# Patient Record
Sex: FEMALE | Race: BLACK OR AFRICAN AMERICAN | NOT HISPANIC OR LATINO | Employment: OTHER | URBAN - METROPOLITAN AREA
[De-identification: names, ages, dates, MRNs, and addresses within clinical notes are randomized per-mention and may not be internally consistent; named-entity substitution may affect disease eponyms.]

---

## 2023-01-25 PROBLEM — M17.9 OSTEOARTHRITIS OF KNEE: Status: ACTIVE | Noted: 2023-01-25

## 2023-01-25 PROBLEM — G47.10 HYPERSOMNOLENCE: Status: ACTIVE | Noted: 2023-01-25

## 2023-01-25 PROBLEM — M75.22 BICEPS TENDINITIS OF LEFT UPPER EXTREMITY: Status: ACTIVE | Noted: 2023-01-25

## 2023-01-25 PROBLEM — K76.89 LIVER CYST: Status: ACTIVE | Noted: 2023-01-25

## 2023-01-25 PROBLEM — M53.3 COCCYX PAIN: Status: ACTIVE | Noted: 2023-01-25

## 2023-01-25 PROBLEM — I67.2 CEREBRAL ATHEROSCLEROSIS: Status: ACTIVE | Noted: 2023-01-25

## 2023-01-25 PROBLEM — E78.00 HYPERCHOLESTEROLEMIA: Status: ACTIVE | Noted: 2023-01-25

## 2023-01-25 PROBLEM — G56.02 MILD CARPAL TUNNEL SYNDROME, LEFT: Status: ACTIVE | Noted: 2023-01-25

## 2023-01-25 PROBLEM — I10 HYPERTENSION: Status: ACTIVE | Noted: 2023-01-25

## 2023-01-25 PROBLEM — M53.3 SACRAL PAIN: Status: ACTIVE | Noted: 2023-01-25

## 2023-01-25 PROBLEM — D64.9 ANEMIA: Status: ACTIVE | Noted: 2023-01-25

## 2023-01-25 PROBLEM — I10 ESSENTIAL HYPERTENSION: Status: ACTIVE | Noted: 2023-01-25

## 2023-01-25 PROBLEM — R29.810 FACIAL DROOP: Status: ACTIVE | Noted: 2023-01-25

## 2023-01-25 PROBLEM — M11.20 PSEUDOGOUT: Status: ACTIVE | Noted: 2023-01-25

## 2023-01-25 PROBLEM — G56.22 CUBITAL TUNNEL SYNDROME, LEFT: Status: ACTIVE | Noted: 2023-01-25

## 2023-01-25 PROBLEM — D47.2 IGG MONOCLONAL GAMMOPATHY: Status: ACTIVE | Noted: 2023-01-25

## 2023-01-25 PROBLEM — R93.2 ABNORMAL MRI, LIVER: Status: ACTIVE | Noted: 2023-01-25

## 2023-01-25 PROBLEM — M85.80 OSTEOPENIA: Status: ACTIVE | Noted: 2023-01-25

## 2023-01-25 PROBLEM — I69.998 SENSATION ALTERATION, LATE EFFECT OF CEREBROVASCULAR DISEASE: Status: ACTIVE | Noted: 2023-01-25

## 2023-01-25 PROBLEM — E55.9 VITAMIN D DEFICIENCY: Status: ACTIVE | Noted: 2023-01-25

## 2023-01-25 PROBLEM — R20.9 SENSATION ALTERATION, LATE EFFECT OF CEREBROVASCULAR DISEASE: Status: ACTIVE | Noted: 2023-01-25

## 2023-01-25 PROBLEM — K76.0 NAFLD (NONALCOHOLIC FATTY LIVER DISEASE): Status: ACTIVE | Noted: 2023-01-25

## 2023-01-25 PROBLEM — M54.31 SCIATICA OF RIGHT SIDE: Status: ACTIVE | Noted: 2023-01-25

## 2023-01-25 PROBLEM — N18.4 CKD (CHRONIC KIDNEY DISEASE) STAGE 4, GFR 15-29 ML/MIN (MULTI): Status: ACTIVE | Noted: 2023-01-25

## 2023-01-25 PROBLEM — T78.3XXA: Status: ACTIVE | Noted: 2023-01-25

## 2023-01-25 PROBLEM — M65.30 TRIGGER FINGER: Status: ACTIVE | Noted: 2023-01-25

## 2023-01-25 PROBLEM — M79.671 RIGHT FOOT PAIN: Status: ACTIVE | Noted: 2023-01-25

## 2023-01-25 PROBLEM — R13.10 DYSPHAGIA: Status: ACTIVE | Noted: 2023-01-25

## 2023-01-25 PROBLEM — E78.5 HYPERLIPIDEMIA: Status: ACTIVE | Noted: 2023-01-25

## 2023-01-25 PROBLEM — E11.9 DIABETES MELLITUS (MULTI): Status: ACTIVE | Noted: 2023-01-25

## 2023-01-25 PROBLEM — L05.91 CYST NEAR COCCYX: Status: ACTIVE | Noted: 2023-01-25

## 2023-01-25 PROBLEM — M25.512 SHOULDER PAIN, LEFT: Status: ACTIVE | Noted: 2023-01-25

## 2023-01-25 PROBLEM — M20.42 HAMMER TOE OF SECOND TOE OF LEFT FOOT: Status: ACTIVE | Noted: 2023-01-25

## 2023-01-25 PROBLEM — G51.0 BELL'S PALSY: Status: ACTIVE | Noted: 2023-01-25

## 2023-01-25 RX ORDER — ALLOPURINOL 100 MG/1
1 TABLET ORAL DAILY
COMMUNITY
Start: 2017-02-15 | End: 2024-02-29 | Stop reason: ALTCHOICE

## 2023-01-25 RX ORDER — ISOPROPYL ALCOHOL 70 ML/100ML
SWAB TOPICAL
COMMUNITY
End: 2023-08-18 | Stop reason: SDUPTHER

## 2023-01-25 RX ORDER — AMLODIPINE BESYLATE 5 MG/1
1 TABLET ORAL DAILY
Status: ON HOLD | COMMUNITY
Start: 2021-02-11 | End: 2023-10-11

## 2023-01-25 RX ORDER — TRAMADOL HYDROCHLORIDE 50 MG/1
1 TABLET ORAL 3 TIMES DAILY PRN
Status: ON HOLD | COMMUNITY
End: 2023-10-11 | Stop reason: ENTERED-IN-ERROR

## 2023-01-25 RX ORDER — ATORVASTATIN CALCIUM 20 MG/1
1 TABLET, FILM COATED ORAL DAILY
Status: ON HOLD | COMMUNITY
End: 2023-10-11 | Stop reason: DRUGHIGH

## 2023-01-25 RX ORDER — LANOLIN ALCOHOL/MO/W.PET/CERES
1 CREAM (GRAM) TOPICAL DAILY
Status: ON HOLD | COMMUNITY
Start: 2020-11-05 | End: 2023-10-11 | Stop reason: ALTCHOICE

## 2023-01-25 RX ORDER — FUROSEMIDE 40 MG/1
2 TABLET ORAL DAILY
COMMUNITY

## 2023-01-25 RX ORDER — ISOPROPYL ALCOHOL 70 ML/100ML
SWAB TOPICAL
Status: ON HOLD | COMMUNITY
End: 2023-10-11

## 2023-01-25 RX ORDER — CLOPIDOGREL BISULFATE 75 MG/1
1 TABLET ORAL DAILY
COMMUNITY

## 2023-01-25 RX ORDER — TIZANIDINE 2 MG/1
2 TABLET ORAL NIGHTLY PRN
COMMUNITY

## 2023-01-25 RX ORDER — SENNOSIDES 25 MG/1
TABLET, FILM COATED ORAL
Status: ON HOLD | COMMUNITY
Start: 2021-12-17 | End: 2023-10-11 | Stop reason: ENTERED-IN-ERROR

## 2023-01-25 RX ORDER — PEN NEEDLE, DIABETIC 30 GX3/16"
NEEDLE, DISPOSABLE MISCELLANEOUS
Status: ON HOLD | COMMUNITY
End: 2023-10-11 | Stop reason: WASHOUT

## 2023-01-25 RX ORDER — CALCIUM CITRATE/VITAMIN D3 200MG-6.25
TABLET ORAL
Status: ON HOLD | COMMUNITY
End: 2023-10-11 | Stop reason: ENTERED-IN-ERROR

## 2023-01-25 RX ORDER — HYDROXYZINE HYDROCHLORIDE 10 MG/1
1 TABLET, FILM COATED ORAL 3 TIMES DAILY PRN
COMMUNITY

## 2023-01-25 RX ORDER — CLOBETASOL PROPIONATE 0.5 MG/G
OINTMENT TOPICAL
COMMUNITY
Start: 2016-08-04

## 2023-01-25 RX ORDER — HUMAN INSULIN 100 [IU]/ML
25 INJECTION, SUSPENSION SUBCUTANEOUS
COMMUNITY

## 2023-01-25 RX ORDER — ATENOLOL 50 MG/1
1 TABLET ORAL DAILY
Status: ON HOLD | COMMUNITY
End: 2023-10-11

## 2023-07-20 LAB
ALBUMIN (MG/L) IN URINE: 79.3 MG/L
ALBUMIN/CREATININE (UG/MG) IN URINE: 84.5 UG/MG CRT (ref 0–30)
APPEARANCE, URINE: CLEAR
BILIRUBIN, URINE: NEGATIVE
BLOOD, URINE: NEGATIVE
COLOR, URINE: ABNORMAL
CREATININE (MG/DL) IN URINE: 93.9 MG/DL (ref 20–320)
GLUCOSE, URINE: NEGATIVE MG/DL
KETONES, URINE: NEGATIVE MG/DL
LEUKOCYTE ESTERASE, URINE: ABNORMAL
NITRITE, URINE: NEGATIVE
PH, URINE: 6 (ref 5–8)
PROTEIN, URINE: NEGATIVE MG/DL
RBC, URINE: NORMAL /HPF (ref 0–5)
SPECIFIC GRAVITY, URINE: 1.01 (ref 1–1.03)
SQUAMOUS EPITHELIAL CELLS, URINE: <1 /HPF
TRANSITIONAL EPITHELIAL CELLS, URINE: <1 /HPF
UROBILINOGEN, URINE: <2 MG/DL (ref 0–1.9)
WBC, URINE: 5 /HPF (ref 0–5)

## 2023-08-18 PROBLEM — R80.9 PROTEINURIA: Status: ACTIVE | Noted: 2023-08-18

## 2023-08-18 RX ORDER — ACETAMINOPHEN 325 MG/1
2 TABLET ORAL EVERY 6 HOURS
Status: ON HOLD | COMMUNITY
Start: 2021-12-12 | End: 2023-10-11

## 2023-08-18 RX ORDER — HYDROXYZINE HYDROCHLORIDE 25 MG/1
1 TABLET, FILM COATED ORAL NIGHTLY
Status: ON HOLD | COMMUNITY
End: 2023-10-11 | Stop reason: DRUGHIGH

## 2023-08-18 RX ORDER — INSULIN ASPART 100 [IU]/ML
18-20 INJECTION, SOLUTION INTRAVENOUS; SUBCUTANEOUS 2 TIMES DAILY
Status: ON HOLD | COMMUNITY
End: 2023-10-11 | Stop reason: ENTERED-IN-ERROR

## 2023-08-18 RX ORDER — CLONIDINE HYDROCHLORIDE 0.1 MG/1
1 TABLET ORAL 2 TIMES DAILY
Status: ON HOLD | COMMUNITY
End: 2023-10-11 | Stop reason: ENTERED-IN-ERROR

## 2023-08-18 RX ORDER — IRBESARTAN 75 MG/1
75 TABLET ORAL DAILY
COMMUNITY
End: 2023-12-19

## 2023-08-18 RX ORDER — DICLOFENAC SODIUM 10 MG/G
GEL TOPICAL DAILY
Status: ON HOLD | COMMUNITY
End: 2023-10-11 | Stop reason: ENTERED-IN-ERROR

## 2023-10-10 ENCOUNTER — HOSPITAL ENCOUNTER (OUTPATIENT)
Facility: HOSPITAL | Age: 78
Setting detail: OBSERVATION
Discharge: HOME | End: 2023-10-12
Attending: EMERGENCY MEDICINE | Admitting: INTERNAL MEDICINE
Payer: MEDICARE

## 2023-10-10 ENCOUNTER — APPOINTMENT (OUTPATIENT)
Dept: RADIOLOGY | Facility: HOSPITAL | Age: 78
End: 2023-10-10
Payer: MEDICARE

## 2023-10-10 DIAGNOSIS — R79.89 ELEVATED D-DIMER: ICD-10-CM

## 2023-10-10 DIAGNOSIS — R60.1 GENERALIZED EDEMA: ICD-10-CM

## 2023-10-10 DIAGNOSIS — R07.9 CHEST PAIN, UNSPECIFIED TYPE: Primary | ICD-10-CM

## 2023-10-10 DIAGNOSIS — N18.9 CHRONIC RENAL IMPAIRMENT, UNSPECIFIED CKD STAGE: ICD-10-CM

## 2023-10-10 DIAGNOSIS — K81.9 CHOLECYSTITIS: ICD-10-CM

## 2023-10-10 LAB
ALBUMIN SERPL BCP-MCNC: 4.4 G/DL (ref 3.4–5)
ALP SERPL-CCNC: 136 U/L (ref 33–136)
ALT SERPL W P-5'-P-CCNC: 11 U/L (ref 7–45)
ANION GAP SERPL CALC-SCNC: 16 MMOL/L (ref 10–20)
AST SERPL W P-5'-P-CCNC: 15 U/L (ref 9–39)
BASOPHILS # BLD AUTO: 0.07 X10*3/UL (ref 0–0.1)
BASOPHILS NFR BLD AUTO: 0.8 %
BILIRUB SERPL-MCNC: 0.5 MG/DL (ref 0–1.2)
BNP SERPL-MCNC: 56 PG/ML (ref 0–99)
BUN SERPL-MCNC: 27 MG/DL (ref 6–23)
CALCIUM SERPL-MCNC: 9.3 MG/DL (ref 8.6–10.3)
CARDIAC TROPONIN I PNL SERPL HS: 6 NG/L (ref 0–13)
CARDIAC TROPONIN I PNL SERPL HS: 7 NG/L (ref 0–13)
CARDIAC TROPONIN I PNL SERPL HS: 7 NG/L (ref 0–13)
CHLORIDE SERPL-SCNC: 102 MMOL/L (ref 98–107)
CO2 SERPL-SCNC: 24 MMOL/L (ref 21–32)
CREAT SERPL-MCNC: 2.37 MG/DL (ref 0.5–1.05)
D DIMER PPP FEU-MCNC: ABNORMAL NG/ML FEU
EOSINOPHIL # BLD AUTO: 0.3 X10*3/UL (ref 0–0.4)
EOSINOPHIL NFR BLD AUTO: 3.3 %
ERYTHROCYTE [DISTWIDTH] IN BLOOD BY AUTOMATED COUNT: 14.1 % (ref 11.5–14.5)
GFR SERPL CREATININE-BSD FRML MDRD: 21 ML/MIN/1.73M*2
GLUCOSE BLD MANUAL STRIP-MCNC: 137 MG/DL (ref 74–99)
GLUCOSE SERPL-MCNC: 166 MG/DL (ref 74–99)
HCT VFR BLD AUTO: 35.1 % (ref 36–46)
HGB BLD-MCNC: 11.7 G/DL (ref 12–16)
IMM GRANULOCYTES # BLD AUTO: 0.05 X10*3/UL (ref 0–0.5)
IMM GRANULOCYTES NFR BLD AUTO: 0.5 % (ref 0–0.9)
LYMPHOCYTES # BLD AUTO: 2.73 X10*3/UL (ref 0.8–3)
LYMPHOCYTES NFR BLD AUTO: 29.6 %
MAGNESIUM SERPL-MCNC: 1.5 MG/DL (ref 1.6–2.4)
MCH RBC QN AUTO: 28.3 PG (ref 26–34)
MCHC RBC AUTO-ENTMCNC: 33.3 G/DL (ref 32–36)
MCV RBC AUTO: 85 FL (ref 80–100)
MONOCYTES # BLD AUTO: 0.56 X10*3/UL (ref 0.05–0.8)
MONOCYTES NFR BLD AUTO: 6.1 %
NEUTROPHILS # BLD AUTO: 5.52 X10*3/UL (ref 1.6–5.5)
NEUTROPHILS NFR BLD AUTO: 59.7 %
NRBC BLD-RTO: 0 /100 WBCS (ref 0–0)
PLATELET # BLD AUTO: 261 X10*3/UL (ref 150–450)
PMV BLD AUTO: 11 FL (ref 7.5–11.5)
POTASSIUM SERPL-SCNC: 3.4 MMOL/L (ref 3.5–5.3)
PROT SERPL-MCNC: 8.2 G/DL (ref 6.4–8.2)
RBC # BLD AUTO: 4.13 X10*6/UL (ref 4–5.2)
SODIUM SERPL-SCNC: 139 MMOL/L (ref 136–145)
WBC # BLD AUTO: 9.2 X10*3/UL (ref 4.4–11.3)

## 2023-10-10 PROCEDURE — 71045 X-RAY EXAM CHEST 1 VIEW: CPT | Mod: FY

## 2023-10-10 PROCEDURE — 96372 THER/PROPH/DIAG INJ SC/IM: CPT | Performed by: NURSE PRACTITIONER

## 2023-10-10 PROCEDURE — 2500000001 HC RX 250 WO HCPCS SELF ADMINISTERED DRUGS (ALT 637 FOR MEDICARE OP): Performed by: NURSE PRACTITIONER

## 2023-10-10 PROCEDURE — 84484 ASSAY OF TROPONIN QUANT: CPT | Performed by: EMERGENCY MEDICINE

## 2023-10-10 PROCEDURE — 83735 ASSAY OF MAGNESIUM: CPT | Performed by: EMERGENCY MEDICINE

## 2023-10-10 PROCEDURE — 36415 COLL VENOUS BLD VENIPUNCTURE: CPT | Performed by: EMERGENCY MEDICINE

## 2023-10-10 PROCEDURE — 99285 EMERGENCY DEPT VISIT HI MDM: CPT | Performed by: EMERGENCY MEDICINE

## 2023-10-10 PROCEDURE — 85379 FIBRIN DEGRADATION QUANT: CPT | Performed by: NURSE PRACTITIONER

## 2023-10-10 PROCEDURE — 85025 COMPLETE CBC W/AUTO DIFF WBC: CPT | Performed by: EMERGENCY MEDICINE

## 2023-10-10 PROCEDURE — 96375 TX/PRO/DX INJ NEW DRUG ADDON: CPT

## 2023-10-10 PROCEDURE — G0378 HOSPITAL OBSERVATION PER HR: HCPCS

## 2023-10-10 PROCEDURE — 74176 CT ABD & PELVIS W/O CONTRAST: CPT | Performed by: RADIOLOGY

## 2023-10-10 PROCEDURE — 71250 CT THORAX DX C-: CPT | Performed by: RADIOLOGY

## 2023-10-10 PROCEDURE — 74176 CT ABD & PELVIS W/O CONTRAST: CPT

## 2023-10-10 PROCEDURE — 2500000004 HC RX 250 GENERAL PHARMACY W/ HCPCS (ALT 636 FOR OP/ED): Performed by: NURSE PRACTITIONER

## 2023-10-10 PROCEDURE — 84484 ASSAY OF TROPONIN QUANT: CPT | Mod: 59 | Performed by: NURSE PRACTITIONER

## 2023-10-10 PROCEDURE — 83880 ASSAY OF NATRIURETIC PEPTIDE: CPT | Performed by: EMERGENCY MEDICINE

## 2023-10-10 PROCEDURE — 71045 X-RAY EXAM CHEST 1 VIEW: CPT | Mod: FOREIGN READ | Performed by: RADIOLOGY

## 2023-10-10 PROCEDURE — 82947 ASSAY GLUCOSE BLOOD QUANT: CPT | Mod: 59

## 2023-10-10 PROCEDURE — 80053 COMPREHEN METABOLIC PANEL: CPT | Performed by: EMERGENCY MEDICINE

## 2023-10-10 PROCEDURE — 99222 1ST HOSP IP/OBS MODERATE 55: CPT | Performed by: NURSE PRACTITIONER

## 2023-10-10 RX ORDER — DOCUSATE SODIUM 100 MG/1
100 CAPSULE, LIQUID FILLED ORAL 2 TIMES DAILY
Status: DISCONTINUED | OUTPATIENT
Start: 2023-10-10 | End: 2023-10-12 | Stop reason: HOSPADM

## 2023-10-10 RX ORDER — INSULIN LISPRO 100 [IU]/ML
0-5 INJECTION, SOLUTION INTRAVENOUS; SUBCUTANEOUS
Status: DISCONTINUED | OUTPATIENT
Start: 2023-10-11 | End: 2023-10-11

## 2023-10-10 RX ORDER — ONDANSETRON HYDROCHLORIDE 2 MG/ML
4 INJECTION, SOLUTION INTRAVENOUS EVERY 8 HOURS PRN
Status: DISCONTINUED | OUTPATIENT
Start: 2023-10-10 | End: 2023-10-11

## 2023-10-10 RX ORDER — AMLODIPINE BESYLATE 5 MG/1
5 TABLET ORAL DAILY
Status: DISCONTINUED | OUTPATIENT
Start: 2023-10-10 | End: 2023-10-11

## 2023-10-10 RX ORDER — ATORVASTATIN CALCIUM 20 MG/1
20 TABLET, FILM COATED ORAL DAILY
Status: DISCONTINUED | OUTPATIENT
Start: 2023-10-10 | End: 2023-10-11

## 2023-10-10 RX ORDER — FUROSEMIDE 10 MG/ML
40 INJECTION INTRAMUSCULAR; INTRAVENOUS 2 TIMES DAILY
Status: DISPENSED | OUTPATIENT
Start: 2023-10-10 | End: 2023-10-12

## 2023-10-10 RX ORDER — ATENOLOL 50 MG/1
50 TABLET ORAL DAILY
Status: DISCONTINUED | OUTPATIENT
Start: 2023-10-11 | End: 2023-10-11

## 2023-10-10 RX ORDER — DEXTROSE MONOHYDRATE 100 MG/ML
0.3 INJECTION, SOLUTION INTRAVENOUS ONCE AS NEEDED
Status: DISCONTINUED | OUTPATIENT
Start: 2023-10-10 | End: 2023-10-12 | Stop reason: HOSPADM

## 2023-10-10 RX ORDER — HEPARIN SODIUM 5000 [USP'U]/ML
5000 INJECTION, SOLUTION INTRAVENOUS; SUBCUTANEOUS EVERY 8 HOURS SCHEDULED
Status: DISCONTINUED | OUTPATIENT
Start: 2023-10-10 | End: 2023-10-11

## 2023-10-10 RX ORDER — ONDANSETRON 4 MG/1
4 TABLET, FILM COATED ORAL EVERY 8 HOURS PRN
Status: DISCONTINUED | OUTPATIENT
Start: 2023-10-10 | End: 2023-10-11

## 2023-10-10 RX ORDER — DEXTROSE 50 % IN WATER (D50W) INTRAVENOUS SYRINGE
25
Status: DISCONTINUED | OUTPATIENT
Start: 2023-10-10 | End: 2023-10-12 | Stop reason: HOSPADM

## 2023-10-10 RX ORDER — ACETAMINOPHEN 325 MG/1
650 TABLET ORAL ONCE
Status: COMPLETED | OUTPATIENT
Start: 2023-10-10 | End: 2023-10-10

## 2023-10-10 RX ORDER — PANTOPRAZOLE SODIUM 40 MG/1
40 TABLET, DELAYED RELEASE ORAL
Status: DISCONTINUED | OUTPATIENT
Start: 2023-10-11 | End: 2023-10-12 | Stop reason: HOSPADM

## 2023-10-10 RX ORDER — FUROSEMIDE 40 MG/1
40 TABLET ORAL 2 TIMES DAILY
Status: DISCONTINUED | OUTPATIENT
Start: 2023-10-10 | End: 2023-10-12 | Stop reason: HOSPADM

## 2023-10-10 RX ORDER — POLYETHYLENE GLYCOL 3350 17 G/17G
17 POWDER, FOR SOLUTION ORAL DAILY PRN
Status: DISCONTINUED | OUTPATIENT
Start: 2023-10-10 | End: 2023-10-12 | Stop reason: HOSPADM

## 2023-10-10 RX ORDER — CLOPIDOGREL BISULFATE 75 MG/1
75 TABLET ORAL DAILY
Status: DISCONTINUED | OUTPATIENT
Start: 2023-10-10 | End: 2023-10-12 | Stop reason: HOSPADM

## 2023-10-10 RX ADMIN — FUROSEMIDE 40 MG: 10 INJECTION, SOLUTION INTRAMUSCULAR; INTRAVENOUS at 23:07

## 2023-10-10 RX ADMIN — CLOPIDOGREL BISULFATE 75 MG: 75 TABLET ORAL at 23:07

## 2023-10-10 RX ADMIN — DOCUSATE SODIUM 100 MG: 100 CAPSULE, LIQUID FILLED ORAL at 23:07

## 2023-10-10 RX ADMIN — ACETAMINOPHEN 650 MG: 325 TABLET ORAL at 19:02

## 2023-10-10 RX ADMIN — HEPARIN SODIUM 5000 UNITS: 5000 INJECTION INTRAVENOUS; SUBCUTANEOUS at 23:07

## 2023-10-10 RX ADMIN — ATORVASTATIN CALCIUM 20 MG: 20 TABLET, FILM COATED ORAL at 23:07

## 2023-10-10 ASSESSMENT — PAIN - FUNCTIONAL ASSESSMENT
PAIN_FUNCTIONAL_ASSESSMENT: 0-10

## 2023-10-10 ASSESSMENT — PAIN DESCRIPTION - ORIENTATION: ORIENTATION: MID

## 2023-10-10 ASSESSMENT — COLUMBIA-SUICIDE SEVERITY RATING SCALE - C-SSRS
2. HAVE YOU ACTUALLY HAD ANY THOUGHTS OF KILLING YOURSELF?: NO
1. IN THE PAST MONTH, HAVE YOU WISHED YOU WERE DEAD OR WISHED YOU COULD GO TO SLEEP AND NOT WAKE UP?: NO
6. HAVE YOU EVER DONE ANYTHING, STARTED TO DO ANYTHING, OR PREPARED TO DO ANYTHING TO END YOUR LIFE?: NO

## 2023-10-10 ASSESSMENT — PAIN DESCRIPTION - DESCRIPTORS
DESCRIPTORS: SHARP
DESCRIPTORS: SHARP

## 2023-10-10 ASSESSMENT — PAIN SCALES - GENERAL
PAINLEVEL_OUTOF10: 6
PAINLEVEL_OUTOF10: 7
PAINLEVEL_OUTOF10: 0 - NO PAIN

## 2023-10-10 ASSESSMENT — ENCOUNTER SYMPTOMS: SHORTNESS OF BREATH: 1

## 2023-10-10 ASSESSMENT — PAIN DESCRIPTION - FREQUENCY: FREQUENCY: CONSTANT/CONTINUOUS

## 2023-10-10 ASSESSMENT — PAIN DESCRIPTION - LOCATION: LOCATION: CHEST

## 2023-10-10 NOTE — ED PROVIDER NOTES
HPI   Chief Complaint   Patient presents with    Chest Pain         History provided by:  Patient   used: No      Patient is 78-year-old female past medical history of HFpEF (EF 60-65% in 2019), DM, HTN, psoriasis, nonalcoholic liver cirrhosis, prior stroke on clopidogrel, COPD, presenting with chest pain.  Patient reports she had 1 episode last night, and then had episode today of chest pain.  Occurred about 2 hours ago while in [place] after walking in, sat down and then felt pain in the center of her chest.  Nonexertional, nonpleuritic.  Describes some lightheadedness and shortness of breath as well but this resolved.  Reported some pain to the left arm as well.  No diaphoresis.  Pain improved and no longer present at this time.  Did not take anything for pain.  Declined aspirin from squad as she said it usually bothers her stomach.  No other meds given, vitals noted to be normal by EMS.  Patient does report she has had some increase in her ankle swelling bilaterally for the past few weeks, is taking Lasix and is mostly see her primary care physician tomorrow.  Does not currently have a cardiologist, and unknown when last time she saw her cardiologist.  Denies any history of CAD, no family history of early cardiac death, non-smoker.  No history of blood clots, not on anticoagulation besides the antiplatelet agent, clopidogrel.                      [place] Coma Scale Score: 15                  Patient History   Past Medical History:   Diagnosis Date    Contusion of left knee, initial encounter 05/28/2021    Contusion of knee, left     Past Surgical History:   Procedure Laterality Date    CT HEAD ANGIO W AND WO IV CONTRAST  5/15/2018    CT HEAD ANGIO W AND WO IV CONTRAST 5/15/2018 [facility]    CT HEAD ANGIO W AND WO IV CONTRAST  6/6/2017    CT HEAD ANGIO W AND WO IV CONTRAST 6/6/2017 [facility]    CT NECK ANGIO W AND WO IV CONTRAST  5/15/2018    CT NECK ANGIO W AND WO IV  CONTRAST 5/15/2018 [facility]    CT NECK ANGIO W AND WO IV CONTRAST  6/6/2017    CT NECK ANGIO W AND WO IV CONTRAST 6/6/2017 [facility]    KNEE SURGERY  04/24/2014    Knee Surgery    MR HEAD ANGIO WO IV CONTRAST  4/12/2014    MR HEAD ANGIO WO IV CONTRAST 4/12/2014 [facility]    MR HEAD ANGIO WO IV CONTRAST  6/18/2019    MR HEAD ANGIO WO IV CONTRAST 6/18/2019 [facility]    MR HEAD ANGIO WO IV CONTRAST  8/16/2021    MR HEAD ANGIO WO IV CONTRAST 8/16/2021 [facility]    MR HEAD ANGIO WO IV CONTRAST  2/4/2017    MR HEAD ANGIO WO IV CONTRAST 2/4/2017 [facility]    MR NECK ANGIO WO IV CONTRAST  4/12/2014    MR NECK ANGIO WO IV CONTRAST 4/12/2014 [facility]    MR NECK ANGIO WO IV CONTRAST  6/18/2019    MR NECK ANGIO WO IV CONTRAST 6/18/2019 [facility]    MR NECK ANGIO WO IV CONTRAST  8/16/2021    MR NECK ANGIO WO IV CONTRAST 8/16/2021 [facility]    MR NECK ANGIO WO IV CONTRAST  2/4/2017    MR NECK ANGIO WO IV CONTRAST 2/4/2017 [facility]     Family History   Problem Relation Name Age of Onset    COPD Mother      Diabetes Mother      Hypertension Mother      Diabetes Sister      Hypertension Sister      Aneurysm Maternal Grandmother       Social History     Tobacco Use    Smoking status: Never    Smokeless tobacco: Never   Substance Use Topics    Alcohol use: Never    Drug use: Not on file       Physical Exam   ED Triage Vitals [10/10/23 1444]   Temp Heart Rate Resp BP   36.3 °C (97.4 °F) 86 22 158/79      SpO2 Temp Source Heart Rate Source Patient Position   99 % Temporal -- --      BP Location FiO2 (%)     -- --       Physical Exam  Vitals and nursing note reviewed.   Constitutional:       General: She is not in acute distress.     Appearance: She is well-developed.   HENT:      Head: Normocephalic and atraumatic.   Eyes:      Conjunctiva/sclera: Conjunctivae normal.   Cardiovascular:      Rate and Rhythm: Normal rate and  regular rhythm.      Heart sounds: No murmur heard.  Pulmonary:      Effort: Pulmonary effort is normal. No respiratory distress.      Breath sounds: Normal breath sounds.   Abdominal:      Palpations: Abdomen is soft.      Tenderness: There is no abdominal tenderness.   Musculoskeletal:         General: No swelling.      Cervical back: Neck supple.      Right lower leg: Edema present.      Left lower leg: Edema present.   Skin:     General: Skin is warm and dry.      Capillary Refill: Capillary refill takes less than 2 seconds.   Neurological:      Mental Status: She is alert.   Psychiatric:         Mood and Affect: Mood normal.       EKG independently interpreted by myself: Sinus rhythm with sinus arrhythmia and PACs, HR 94, normal axis, narrow QRS, no ST elevations      ED Course & MDM        Medical Decision Making      Procedure  Procedures     Huey Mistry MD  10/10/23 1520       Huey Mistry MD  10/10/23 4111

## 2023-10-10 NOTE — ED TRIAGE NOTES
Pt BIBA with the c/o substernal chest pain. Pt states that the pain started about 1 hour ago, described as sharp.

## 2023-10-11 ENCOUNTER — APPOINTMENT (OUTPATIENT)
Dept: RADIOLOGY | Facility: HOSPITAL | Age: 78
End: 2023-10-11
Payer: MEDICARE

## 2023-10-11 ENCOUNTER — ANESTHESIA (OUTPATIENT)
Dept: OPERATING ROOM | Facility: HOSPITAL | Age: 78
End: 2023-10-11
Payer: MEDICARE

## 2023-10-11 ENCOUNTER — APPOINTMENT (OUTPATIENT)
Dept: CARDIOLOGY | Facility: HOSPITAL | Age: 78
End: 2023-10-11
Payer: MEDICARE

## 2023-10-11 ENCOUNTER — ANESTHESIA EVENT (OUTPATIENT)
Dept: OPERATING ROOM | Facility: HOSPITAL | Age: 78
End: 2023-10-11
Payer: MEDICARE

## 2023-10-11 PROBLEM — M50.30 DEGENERATION OF CERVICAL INTERVERTEBRAL DISC: Status: ACTIVE | Noted: 2023-10-11

## 2023-10-11 PROBLEM — R79.89 ELEVATED D-DIMER: Status: ACTIVE | Noted: 2023-10-11

## 2023-10-11 PROBLEM — E11.9 DIABETES MELLITUS (MULTI): Chronic | Status: ACTIVE | Noted: 2023-01-25

## 2023-10-11 PROBLEM — G47.33 OBSTRUCTIVE SLEEP APNEA SYNDROME: Status: ACTIVE | Noted: 2021-10-14

## 2023-10-11 PROBLEM — I67.2 CEREBRAL ATHEROSCLEROSIS: Chronic | Status: ACTIVE | Noted: 2023-01-25

## 2023-10-11 PROBLEM — N18.9 ANEMIA IN CHRONIC KIDNEY DISEASE: Chronic | Status: ACTIVE | Noted: 2023-01-25

## 2023-10-11 PROBLEM — K81.9 CHOLECYSTITIS: Status: ACTIVE | Noted: 2023-10-10

## 2023-10-11 PROBLEM — N18.9 ANEMIA IN CHRONIC KIDNEY DISEASE: Status: ACTIVE | Noted: 2023-01-25

## 2023-10-11 PROBLEM — N18.9 CHRONIC RENAL INSUFFICIENCY: Status: ACTIVE | Noted: 2023-10-11

## 2023-10-11 PROBLEM — N18.4 CKD (CHRONIC KIDNEY DISEASE) STAGE 4, GFR 15-29 ML/MIN (MULTI): Chronic | Status: ACTIVE | Noted: 2023-01-25

## 2023-10-11 PROBLEM — T88.59XA DELAYED EMERGENCE FROM ANESTHESIA: Status: ACTIVE | Noted: 2023-10-11

## 2023-10-11 PROBLEM — I10 ESSENTIAL HYPERTENSION: Chronic | Status: ACTIVE | Noted: 2023-01-25

## 2023-10-11 PROBLEM — M51.379 DEGENERATION OF LUMBOSACRAL INTERVERTEBRAL DISC: Status: ACTIVE | Noted: 2023-10-11

## 2023-10-11 PROBLEM — D63.1 ANEMIA IN CHRONIC KIDNEY DISEASE: Status: ACTIVE | Noted: 2023-01-25

## 2023-10-11 PROBLEM — R10.13 EPIGASTRIC PAIN: Status: ACTIVE | Noted: 2023-10-11

## 2023-10-11 PROBLEM — E78.2 MIXED HYPERLIPIDEMIA: Chronic | Status: ACTIVE | Noted: 2023-01-25

## 2023-10-11 PROBLEM — I65.23 OCCLUSION AND STENOSIS OF BILATERAL CAROTID ARTERIES: Chronic | Status: ACTIVE | Noted: 2023-10-11

## 2023-10-11 PROBLEM — I63.9 CVA (CEREBRAL VASCULAR ACCIDENT) (MULTI): Status: ACTIVE | Noted: 2023-10-11

## 2023-10-11 PROBLEM — E78.2 MIXED HYPERLIPIDEMIA: Status: ACTIVE | Noted: 2023-01-25

## 2023-10-11 PROBLEM — I70.213 INTERMITTENT CLAUDICATION OF BOTH LOWER EXTREMITIES DUE TO ATHEROSCLEROSIS (CMS-HCC): Chronic | Status: ACTIVE | Noted: 2023-10-11

## 2023-10-11 PROBLEM — I65.23 OCCLUSION AND STENOSIS OF BILATERAL CAROTID ARTERIES: Status: ACTIVE | Noted: 2023-10-11

## 2023-10-11 PROBLEM — K76.0 NAFLD (NONALCOHOLIC FATTY LIVER DISEASE): Chronic | Status: ACTIVE | Noted: 2023-01-25

## 2023-10-11 PROBLEM — D63.1 ANEMIA IN CHRONIC KIDNEY DISEASE: Chronic | Status: ACTIVE | Noted: 2023-01-25

## 2023-10-11 PROBLEM — D47.2 MGUS (MONOCLONAL GAMMOPATHY OF UNKNOWN SIGNIFICANCE): Chronic | Status: ACTIVE | Noted: 2023-01-25

## 2023-10-11 PROBLEM — G47.33 OBSTRUCTIVE SLEEP APNEA SYNDROME: Chronic | Status: ACTIVE | Noted: 2021-10-14

## 2023-10-11 PROBLEM — I70.213 INTERMITTENT CLAUDICATION OF BOTH LOWER EXTREMITIES DUE TO ATHEROSCLEROSIS (CMS-HCC): Status: ACTIVE | Noted: 2023-10-11

## 2023-10-11 PROBLEM — M51.37 DEGENERATION OF LUMBOSACRAL INTERVERTEBRAL DISC: Status: ACTIVE | Noted: 2023-10-11

## 2023-10-11 LAB
ANION GAP SERPL CALC-SCNC: 14 MMOL/L (ref 10–20)
BASOPHILS # BLD AUTO: 0.05 X10*3/UL (ref 0–0.1)
BASOPHILS NFR BLD AUTO: 0.7 %
BUN SERPL-MCNC: 23 MG/DL (ref 6–23)
CALCIUM SERPL-MCNC: 9 MG/DL (ref 8.6–10.3)
CARDIAC TROPONIN I PNL SERPL HS: 7 NG/L (ref 0–13)
CHLORIDE SERPL-SCNC: 106 MMOL/L (ref 98–107)
CO2 SERPL-SCNC: 26 MMOL/L (ref 21–32)
CREAT SERPL-MCNC: 2.16 MG/DL (ref 0.5–1.05)
EJECTION FRACTION APICAL 4 CHAMBER: 59.7
EOSINOPHIL # BLD AUTO: 0.33 X10*3/UL (ref 0–0.4)
EOSINOPHIL NFR BLD AUTO: 4.3 %
ERYTHROCYTE [DISTWIDTH] IN BLOOD BY AUTOMATED COUNT: 14.1 % (ref 11.5–14.5)
GFR SERPL CREATININE-BSD FRML MDRD: 23 ML/MIN/1.73M*2
GLUCOSE BLD MANUAL STRIP-MCNC: 106 MG/DL (ref 74–99)
GLUCOSE BLD MANUAL STRIP-MCNC: 110 MG/DL (ref 74–99)
GLUCOSE BLD MANUAL STRIP-MCNC: 129 MG/DL (ref 74–99)
GLUCOSE BLD MANUAL STRIP-MCNC: 151 MG/DL (ref 74–99)
GLUCOSE SERPL-MCNC: 124 MG/DL (ref 74–99)
HCT VFR BLD AUTO: 31.9 % (ref 36–46)
HGB BLD-MCNC: 10.5 G/DL (ref 12–16)
IMM GRANULOCYTES # BLD AUTO: 0.03 X10*3/UL (ref 0–0.5)
IMM GRANULOCYTES NFR BLD AUTO: 0.4 % (ref 0–0.9)
LYMPHOCYTES # BLD AUTO: 2.42 X10*3/UL (ref 0.8–3)
LYMPHOCYTES NFR BLD AUTO: 31.8 %
MCH RBC QN AUTO: 28.2 PG (ref 26–34)
MCHC RBC AUTO-ENTMCNC: 32.9 G/DL (ref 32–36)
MCV RBC AUTO: 86 FL (ref 80–100)
MONOCYTES # BLD AUTO: 0.49 X10*3/UL (ref 0.05–0.8)
MONOCYTES NFR BLD AUTO: 6.4 %
NEUTROPHILS # BLD AUTO: 4.29 X10*3/UL (ref 1.6–5.5)
NEUTROPHILS NFR BLD AUTO: 56.4 %
NRBC BLD-RTO: 0 /100 WBCS (ref 0–0)
PLATELET # BLD AUTO: 220 X10*3/UL (ref 150–450)
PMV BLD AUTO: 10.5 FL (ref 7.5–11.5)
POTASSIUM SERPL-SCNC: 3.6 MMOL/L (ref 3.5–5.3)
RBC # BLD AUTO: 3.73 X10*6/UL (ref 4–5.2)
SARS-COV-2 RNA RESP QL NAA+PROBE: NOT DETECTED
SODIUM SERPL-SCNC: 142 MMOL/L (ref 136–145)
WBC # BLD AUTO: 7.6 X10*3/UL (ref 4.4–11.3)

## 2023-10-11 PROCEDURE — 3600000004 HC OR TIME - INITIAL BASE CHARGE - PROCEDURE LEVEL FOUR: Performed by: SURGERY

## 2023-10-11 PROCEDURE — 88304 TISSUE EXAM BY PATHOLOGIST: CPT | Performed by: STUDENT IN AN ORGANIZED HEALTH CARE EDUCATION/TRAINING PROGRAM

## 2023-10-11 PROCEDURE — 36415 COLL VENOUS BLD VENIPUNCTURE: CPT | Performed by: PHYSICIAN ASSISTANT

## 2023-10-11 PROCEDURE — 80048 BASIC METABOLIC PNL TOTAL CA: CPT | Performed by: NURSE PRACTITIONER

## 2023-10-11 PROCEDURE — 93306 TTE W/DOPPLER COMPLETE: CPT | Performed by: INTERNAL MEDICINE

## 2023-10-11 PROCEDURE — 2500000002 HC RX 250 W HCPCS SELF ADMINISTERED DRUGS (ALT 637 FOR MEDICARE OP, ALT 636 FOR OP/ED): Performed by: SURGERY

## 2023-10-11 PROCEDURE — A4217 STERILE WATER/SALINE, 500 ML: HCPCS | Performed by: SURGERY

## 2023-10-11 PROCEDURE — 2500000005 HC RX 250 GENERAL PHARMACY W/O HCPCS: Performed by: ANESTHESIOLOGIST ASSISTANT

## 2023-10-11 PROCEDURE — 87635 SARS-COV-2 COVID-19 AMP PRB: CPT | Performed by: NURSE PRACTITIONER

## 2023-10-11 PROCEDURE — 2500000004 HC RX 250 GENERAL PHARMACY W/ HCPCS (ALT 636 FOR OP/ED): Performed by: NURSE PRACTITIONER

## 2023-10-11 PROCEDURE — 84484 ASSAY OF TROPONIN QUANT: CPT | Mod: 59 | Performed by: PHYSICIAN ASSISTANT

## 2023-10-11 PROCEDURE — 2500000002 HC RX 250 W HCPCS SELF ADMINISTERED DRUGS (ALT 637 FOR MEDICARE OP, ALT 636 FOR OP/ED): Performed by: ANESTHESIOLOGIST ASSISTANT

## 2023-10-11 PROCEDURE — 93306 TTE W/DOPPLER COMPLETE: CPT

## 2023-10-11 PROCEDURE — 78803 RP LOCLZJ TUM SPECT 1 AREA: CPT

## 2023-10-11 PROCEDURE — 3600000009 HC OR TIME - EACH INCREMENTAL 1 MINUTE - PROCEDURE LEVEL FOUR: Performed by: SURGERY

## 2023-10-11 PROCEDURE — 2500000004 HC RX 250 GENERAL PHARMACY W/ HCPCS (ALT 636 FOR OP/ED): Performed by: SURGERY

## 2023-10-11 PROCEDURE — 3430000001 HC RX 343 DIAGNOSTIC RADIOPHARMACEUTICALS: Performed by: INTERNAL MEDICINE

## 2023-10-11 PROCEDURE — 3700000002 HC GENERAL ANESTHESIA TIME - EACH INCREMENTAL 1 MINUTE: Performed by: SURGERY

## 2023-10-11 PROCEDURE — A47562 PR LAP,CHOLECYSTECTOMY: Performed by: ANESTHESIOLOGIST ASSISTANT

## 2023-10-11 PROCEDURE — 2500000005 HC RX 250 GENERAL PHARMACY W/O HCPCS: Performed by: SURGERY

## 2023-10-11 PROCEDURE — A9540 TC99M MAA: HCPCS | Performed by: INTERNAL MEDICINE

## 2023-10-11 PROCEDURE — 3700000001 HC GENERAL ANESTHESIA TIME - INITIAL BASE CHARGE: Performed by: SURGERY

## 2023-10-11 PROCEDURE — 93970 EXTREMITY STUDY: CPT

## 2023-10-11 PROCEDURE — 94640 AIRWAY INHALATION TREATMENT: CPT | Mod: XU

## 2023-10-11 PROCEDURE — 2500000001 HC RX 250 WO HCPCS SELF ADMINISTERED DRUGS (ALT 637 FOR MEDICARE OP): Performed by: NURSE PRACTITIONER

## 2023-10-11 PROCEDURE — 2780000003 HC OR 278 NO HCPCS: Performed by: SURGERY

## 2023-10-11 PROCEDURE — 7100000001 HC RECOVERY ROOM TIME - INITIAL BASE CHARGE: Performed by: SURGERY

## 2023-10-11 PROCEDURE — 99221 1ST HOSP IP/OBS SF/LOW 40: CPT | Performed by: SURGERY

## 2023-10-11 PROCEDURE — 47562 LAPAROSCOPIC CHOLECYSTECTOMY: CPT | Performed by: SURGERY

## 2023-10-11 PROCEDURE — 85025 COMPLETE CBC W/AUTO DIFF WBC: CPT | Performed by: NURSE PRACTITIONER

## 2023-10-11 PROCEDURE — 99232 SBSQ HOSP IP/OBS MODERATE 35: CPT | Performed by: NURSE PRACTITIONER

## 2023-10-11 PROCEDURE — 2500000001 HC RX 250 WO HCPCS SELF ADMINISTERED DRUGS (ALT 637 FOR MEDICARE OP): Performed by: PHYSICIAN ASSISTANT

## 2023-10-11 PROCEDURE — 96372 THER/PROPH/DIAG INJ SC/IM: CPT | Performed by: SURGERY

## 2023-10-11 PROCEDURE — 82947 ASSAY GLUCOSE BLOOD QUANT: CPT | Mod: 91

## 2023-10-11 PROCEDURE — 78580 LUNG PERFUSION IMAGING: CPT | Mod: ME

## 2023-10-11 PROCEDURE — 99100 ANES PT EXTEME AGE<1 YR&>70: CPT | Performed by: ANESTHESIOLOGY

## 2023-10-11 PROCEDURE — 96376 TX/PRO/DX INJ SAME DRUG ADON: CPT | Mod: 59

## 2023-10-11 PROCEDURE — 7100000002 HC RECOVERY ROOM TIME - EACH INCREMENTAL 1 MINUTE: Performed by: SURGERY

## 2023-10-11 PROCEDURE — 2580000001 HC RX 258 IV SOLUTIONS: Performed by: ANESTHESIOLOGIST ASSISTANT

## 2023-10-11 PROCEDURE — 96372 THER/PROPH/DIAG INJ SC/IM: CPT | Performed by: NURSE PRACTITIONER

## 2023-10-11 PROCEDURE — 93970 EXTREMITY STUDY: CPT | Performed by: RADIOLOGY

## 2023-10-11 PROCEDURE — 88304 TISSUE EXAM BY PATHOLOGIST: CPT | Mod: TC,AHULAB | Performed by: SURGERY

## 2023-10-11 PROCEDURE — 2720000007 HC OR 272 NO HCPCS: Performed by: SURGERY

## 2023-10-11 PROCEDURE — 78830 RP LOCLZJ TUM SPECT W/CT 1: CPT | Performed by: RADIOLOGY

## 2023-10-11 PROCEDURE — 2500000004 HC RX 250 GENERAL PHARMACY W/ HCPCS (ALT 636 FOR OP/ED): Performed by: ANESTHESIOLOGY

## 2023-10-11 PROCEDURE — G0378 HOSPITAL OBSERVATION PER HR: HCPCS

## 2023-10-11 PROCEDURE — 76705 ECHO EXAM OF ABDOMEN: CPT

## 2023-10-11 PROCEDURE — 2500000004 HC RX 250 GENERAL PHARMACY W/ HCPCS (ALT 636 FOR OP/ED): Performed by: ANESTHESIOLOGIST ASSISTANT

## 2023-10-11 PROCEDURE — 99234 HOSP IP/OBS SM DT SF/LOW 45: CPT | Performed by: INTERNAL MEDICINE

## 2023-10-11 PROCEDURE — 76705 ECHO EXAM OF ABDOMEN: CPT | Performed by: RADIOLOGY

## 2023-10-11 PROCEDURE — 99358 PROLONG SERVICE W/O CONTACT: CPT | Performed by: PHYSICIAN ASSISTANT

## 2023-10-11 PROCEDURE — 36415 COLL VENOUS BLD VENIPUNCTURE: CPT | Performed by: NURSE PRACTITIONER

## 2023-10-11 PROCEDURE — A47562 PR LAP,CHOLECYSTECTOMY: Performed by: ANESTHESIOLOGY

## 2023-10-11 RX ORDER — OXYCODONE HYDROCHLORIDE 5 MG/1
5 TABLET ORAL EVERY 6 HOURS PRN
Status: DISCONTINUED | OUTPATIENT
Start: 2023-10-11 | End: 2023-10-12 | Stop reason: HOSPADM

## 2023-10-11 RX ORDER — ONDANSETRON HYDROCHLORIDE 2 MG/ML
4 INJECTION, SOLUTION INTRAVENOUS ONCE AS NEEDED
Status: DISCONTINUED | OUTPATIENT
Start: 2023-10-11 | End: 2023-10-11 | Stop reason: HOSPADM

## 2023-10-11 RX ORDER — OXYCODONE AND ACETAMINOPHEN 5; 325 MG/1; MG/1
1 TABLET ORAL
Status: DISCONTINUED | OUTPATIENT
Start: 2023-10-11 | End: 2023-10-11

## 2023-10-11 RX ORDER — AMLODIPINE BESYLATE 10 MG/1
10 TABLET ORAL DAILY
COMMUNITY

## 2023-10-11 RX ORDER — BUPIVACAINE HYDROCHLORIDE 5 MG/ML
INJECTION, SOLUTION PERINEURAL AS NEEDED
Status: DISCONTINUED | OUTPATIENT
Start: 2023-10-11 | End: 2023-10-11 | Stop reason: HOSPADM

## 2023-10-11 RX ORDER — INSULIN LISPRO 100 [IU]/ML
0-5 INJECTION, SOLUTION INTRAVENOUS; SUBCUTANEOUS
Status: DISCONTINUED | OUTPATIENT
Start: 2023-10-11 | End: 2023-10-12 | Stop reason: HOSPADM

## 2023-10-11 RX ORDER — SODIUM CHLORIDE, SODIUM LACTATE, POTASSIUM CHLORIDE, CALCIUM CHLORIDE 600; 310; 30; 20 MG/100ML; MG/100ML; MG/100ML; MG/100ML
100 INJECTION, SOLUTION INTRAVENOUS CONTINUOUS
Status: DISCONTINUED | OUTPATIENT
Start: 2023-10-11 | End: 2023-10-11 | Stop reason: HOSPADM

## 2023-10-11 RX ORDER — FENTANYL CITRATE 50 UG/ML
INJECTION, SOLUTION INTRAMUSCULAR; INTRAVENOUS AS NEEDED
Status: DISCONTINUED | OUTPATIENT
Start: 2023-10-11 | End: 2023-10-11

## 2023-10-11 RX ORDER — SPIRONOLACTONE 50 MG/1
50 TABLET, FILM COATED ORAL DAILY
COMMUNITY
End: 2024-02-29 | Stop reason: WASHOUT

## 2023-10-11 RX ORDER — ROCURONIUM BROMIDE 10 MG/ML
INJECTION, SOLUTION INTRAVENOUS AS NEEDED
Status: DISCONTINUED | OUTPATIENT
Start: 2023-10-11 | End: 2023-10-11

## 2023-10-11 RX ORDER — TRAMADOL HYDROCHLORIDE 50 MG/1
50 TABLET ORAL EVERY 12 HOURS PRN
Status: DISCONTINUED | OUTPATIENT
Start: 2023-10-11 | End: 2023-10-11

## 2023-10-11 RX ORDER — ACETAMINOPHEN 325 MG/1
975 TABLET ORAL EVERY 6 HOURS PRN
Status: DISCONTINUED | OUTPATIENT
Start: 2023-10-11 | End: 2023-10-12 | Stop reason: HOSPADM

## 2023-10-11 RX ORDER — HEPARIN SODIUM 5000 [USP'U]/ML
5000 INJECTION, SOLUTION INTRAVENOUS; SUBCUTANEOUS EVERY 8 HOURS SCHEDULED
Status: DISCONTINUED | OUTPATIENT
Start: 2023-10-12 | End: 2023-10-12 | Stop reason: HOSPADM

## 2023-10-11 RX ORDER — SODIUM CHLORIDE 0.9 G/100ML
IRRIGANT IRRIGATION AS NEEDED
Status: DISCONTINUED | OUTPATIENT
Start: 2023-10-11 | End: 2023-10-11 | Stop reason: HOSPADM

## 2023-10-11 RX ORDER — ALBUTEROL SULFATE 90 UG/1
AEROSOL, METERED RESPIRATORY (INHALATION) AS NEEDED
Status: DISCONTINUED | OUTPATIENT
Start: 2023-10-11 | End: 2023-10-11

## 2023-10-11 RX ORDER — ATORVASTATIN CALCIUM 80 MG/1
80 TABLET, FILM COATED ORAL DAILY
Status: DISCONTINUED | OUTPATIENT
Start: 2023-10-11 | End: 2023-10-12 | Stop reason: HOSPADM

## 2023-10-11 RX ORDER — POLYETHYLENE GLYCOL 3350 17 G/17G
17 POWDER, FOR SOLUTION ORAL DAILY
Status: DISCONTINUED | OUTPATIENT
Start: 2023-10-11 | End: 2023-10-12 | Stop reason: HOSPADM

## 2023-10-11 RX ORDER — CEFAZOLIN SODIUM 2 G/50ML
SOLUTION INTRAVENOUS AS NEEDED
Status: DISCONTINUED | OUTPATIENT
Start: 2023-10-11 | End: 2023-10-11

## 2023-10-11 RX ORDER — PROPOFOL 10 MG/ML
INJECTION, EMULSION INTRAVENOUS AS NEEDED
Status: DISCONTINUED | OUTPATIENT
Start: 2023-10-11 | End: 2023-10-11

## 2023-10-11 RX ORDER — AMLODIPINE BESYLATE 10 MG/1
10 TABLET ORAL DAILY
Status: DISCONTINUED | OUTPATIENT
Start: 2023-10-11 | End: 2023-10-12 | Stop reason: HOSPADM

## 2023-10-11 RX ORDER — ALBUTEROL SULFATE 90 UG/1
2 AEROSOL, METERED RESPIRATORY (INHALATION) EVERY 4 HOURS PRN
Status: ON HOLD | COMMUNITY
End: 2023-10-11 | Stop reason: SDDI

## 2023-10-11 RX ORDER — OXYCODONE HYDROCHLORIDE 5 MG/1
5 TABLET ORAL EVERY 4 HOURS PRN
Status: DISCONTINUED | OUTPATIENT
Start: 2023-10-11 | End: 2023-10-11 | Stop reason: HOSPADM

## 2023-10-11 RX ORDER — MEPERIDINE HYDROCHLORIDE 25 MG/ML
12.5 INJECTION INTRAMUSCULAR; INTRAVENOUS; SUBCUTANEOUS EVERY 10 MIN PRN
Status: DISCONTINUED | OUTPATIENT
Start: 2023-10-11 | End: 2023-10-11 | Stop reason: HOSPADM

## 2023-10-11 RX ORDER — CEFAZOLIN SODIUM 1 G/50ML
1 SOLUTION INTRAVENOUS EVERY 6 HOURS
Status: COMPLETED | OUTPATIENT
Start: 2023-10-11 | End: 2023-10-12

## 2023-10-11 RX ORDER — ATORVASTATIN CALCIUM 80 MG/1
80 TABLET, FILM COATED ORAL DAILY
COMMUNITY

## 2023-10-11 RX ORDER — LIDOCAINE HYDROCHLORIDE 20 MG/ML
INJECTION, SOLUTION INFILTRATION; PERINEURAL AS NEEDED
Status: DISCONTINUED | OUTPATIENT
Start: 2023-10-11 | End: 2023-10-11

## 2023-10-11 RX ADMIN — SUGAMMADEX 200 MG: 100 INJECTION, SOLUTION INTRAVENOUS at 17:43

## 2023-10-11 RX ADMIN — SODIUM CHLORIDE, SODIUM LACTATE, POTASSIUM CHLORIDE, AND CALCIUM CHLORIDE: 600; 310; 30; 20 INJECTION, SOLUTION INTRAVENOUS at 16:59

## 2023-10-11 RX ADMIN — LIDOCAINE HYDROCHLORIDE 70 MG: 20 INJECTION, SOLUTION INFILTRATION; PERINEURAL at 17:04

## 2023-10-11 RX ADMIN — PROPOFOL 25 MG: 10 INJECTION, EMULSION INTRAVENOUS at 17:10

## 2023-10-11 RX ADMIN — POLYETHYLENE GLYCOL (3350) 17 G: 17 POWDER, FOR SOLUTION ORAL at 21:58

## 2023-10-11 RX ADMIN — ALBUTEROL SULFATE 5 PUFF: 90 AEROSOL, METERED RESPIRATORY (INHALATION) at 17:30

## 2023-10-11 RX ADMIN — ATORVASTATIN CALCIUM 80 MG: 80 TABLET, FILM COATED ORAL at 08:27

## 2023-10-11 RX ADMIN — DOCUSATE SODIUM 100 MG: 100 CAPSULE, LIQUID FILLED ORAL at 08:28

## 2023-10-11 RX ADMIN — AMLODIPINE BESYLATE 10 MG: 10 TABLET ORAL at 08:27

## 2023-10-11 RX ADMIN — PROPOFOL 25 MG: 10 INJECTION, EMULSION INTRAVENOUS at 17:15

## 2023-10-11 RX ADMIN — ROCURONIUM BROMIDE 50 MG: 100 INJECTION, SOLUTION INTRAVENOUS at 17:04

## 2023-10-11 RX ADMIN — PROPOFOL 150 MG: 10 INJECTION, EMULSION INTRAVENOUS at 17:04

## 2023-10-11 RX ADMIN — CEFAZOLIN SODIUM 2 G: 2 SOLUTION INTRAVENOUS at 17:08

## 2023-10-11 RX ADMIN — POLYETHYLENE GLYCOL 3350 17 G: 17 POWDER, FOR SOLUTION ORAL at 22:49

## 2023-10-11 RX ADMIN — HEPARIN SODIUM 5000 UNITS: 5000 INJECTION INTRAVENOUS; SUBCUTANEOUS at 06:20

## 2023-10-11 RX ADMIN — CLOPIDOGREL BISULFATE 75 MG: 75 TABLET ORAL at 08:28

## 2023-10-11 RX ADMIN — FENTANYL CITRATE 50 MCG: 50 INJECTION, SOLUTION INTRAMUSCULAR; INTRAVENOUS at 17:04

## 2023-10-11 RX ADMIN — PANTOPRAZOLE SODIUM 40 MG: 40 TABLET, DELAYED RELEASE ORAL at 06:20

## 2023-10-11 RX ADMIN — OXYCODONE HYDROCHLORIDE 5 MG: 5 TABLET ORAL at 22:45

## 2023-10-11 RX ADMIN — KIT FOR THE PREPARATION OF TECHNETIUM TC 99M ALBUMIN AGGREGATED 4.2 MILLICURIE: 2.5 INJECTION, POWDER, FOR SOLUTION INTRAVENOUS at 13:29

## 2023-10-11 RX ADMIN — TRAMADOL HYDROCHLORIDE 50 MG: 50 TABLET, COATED ORAL at 02:53

## 2023-10-11 RX ADMIN — BENZOCAINE AND MENTHOL 1 LOZENGE: 15; 3.6 LOZENGE ORAL at 22:45

## 2023-10-11 RX ADMIN — INSULIN LISPRO 1 UNITS: 100 INJECTION, SOLUTION INTRAVENOUS; SUBCUTANEOUS at 23:08

## 2023-10-11 RX ADMIN — HYDROMORPHONE HYDROCHLORIDE 0.5 MG: 1 INJECTION, SOLUTION INTRAMUSCULAR; INTRAVENOUS; SUBCUTANEOUS at 18:27

## 2023-10-11 RX ADMIN — CEFAZOLIN SODIUM 1 G: 1 INJECTION, SOLUTION INTRAVENOUS at 22:54

## 2023-10-11 RX ADMIN — FUROSEMIDE 40 MG: 10 INJECTION, SOLUTION INTRAMUSCULAR; INTRAVENOUS at 22:41

## 2023-10-11 SDOH — SOCIAL STABILITY: SOCIAL INSECURITY: HAVE YOU HAD THOUGHTS OF HARMING ANYONE ELSE?: NO

## 2023-10-11 SDOH — SOCIAL STABILITY: SOCIAL INSECURITY: ABUSE: ADULT

## 2023-10-11 SDOH — SOCIAL STABILITY: SOCIAL INSECURITY: WERE YOU ABLE TO COMPLETE ALL THE BEHAVIORAL HEALTH SCREENINGS?: YES

## 2023-10-11 SDOH — SOCIAL STABILITY: SOCIAL INSECURITY: DOES ANYONE TRY TO KEEP YOU FROM HAVING/CONTACTING OTHER FRIENDS OR DOING THINGS OUTSIDE YOUR HOME?: NO

## 2023-10-11 SDOH — SOCIAL STABILITY: SOCIAL INSECURITY: ARE THERE ANY APPARENT SIGNS OF INJURIES/BEHAVIORS THAT COULD BE RELATED TO ABUSE/NEGLECT?: NO

## 2023-10-11 SDOH — SOCIAL STABILITY: SOCIAL INSECURITY: ARE YOU OR HAVE YOU BEEN THREATENED OR ABUSED PHYSICALLY, EMOTIONALLY, OR SEXUALLY BY ANYONE?: NO

## 2023-10-11 SDOH — SOCIAL STABILITY: SOCIAL INSECURITY: HAS ANYONE EVER THREATENED TO HURT YOUR FAMILY OR YOUR PETS?: NO

## 2023-10-11 SDOH — SOCIAL STABILITY: SOCIAL INSECURITY: DO YOU FEEL ANYONE HAS EXPLOITED OR TAKEN ADVANTAGE OF YOU FINANCIALLY OR OF YOUR PERSONAL PROPERTY?: NO

## 2023-10-11 SDOH — SOCIAL STABILITY: SOCIAL INSECURITY: DO YOU FEEL UNSAFE GOING BACK TO THE PLACE WHERE YOU ARE LIVING?: NO

## 2023-10-11 ASSESSMENT — COGNITIVE AND FUNCTIONAL STATUS - GENERAL
WALKING IN HOSPITAL ROOM: A LITTLE
DAILY ACTIVITIY SCORE: 24
DAILY ACTIVITIY SCORE: 18
TURNING FROM BACK TO SIDE WHILE IN FLAT BAD: A LITTLE
TURNING FROM BACK TO SIDE WHILE IN FLAT BAD: A LITTLE
PATIENT BASELINE BEDBOUND: NO
EATING MEALS: A LITTLE
DAILY ACTIVITIY SCORE: 22
DRESSING REGULAR LOWER BODY CLOTHING: A LITTLE
DRESSING REGULAR UPPER BODY CLOTHING: A LITTLE
STANDING UP FROM CHAIR USING ARMS: A LITTLE
MOBILITY SCORE: 17
MOVING FROM LYING ON BACK TO SITTING ON SIDE OF FLAT BED WITH BEDRAILS: A LITTLE
CLIMB 3 TO 5 STEPS WITH RAILING: A LOT
MOVING TO AND FROM BED TO CHAIR: A LITTLE
WALKING IN HOSPITAL ROOM: A LITTLE
TOILETING: A LITTLE
MOVING FROM LYING ON BACK TO SITTING ON SIDE OF FLAT BED WITH BEDRAILS: A LITTLE
DRESSING REGULAR UPPER BODY CLOTHING: A LITTLE
DRESSING REGULAR LOWER BODY CLOTHING: A LITTLE
MOBILITY SCORE: 21
PERSONAL GROOMING: A LITTLE
MOBILITY SCORE: 24
HELP NEEDED FOR BATHING: A LITTLE

## 2023-10-11 ASSESSMENT — PATIENT HEALTH QUESTIONNAIRE - PHQ9
2. FEELING DOWN, DEPRESSED OR HOPELESS: NOT AT ALL
1. LITTLE INTEREST OR PLEASURE IN DOING THINGS: NOT AT ALL
SUM OF ALL RESPONSES TO PHQ9 QUESTIONS 1 & 2: 0

## 2023-10-11 ASSESSMENT — ENCOUNTER SYMPTOMS
NEUROLOGICAL NEGATIVE: 1
CONSTIPATION: 1
EYES NEGATIVE: 1
MUSCULOSKELETAL NEGATIVE: 1
FATIGUE: 1
CHEST TIGHTNESS: 1
PSYCHIATRIC NEGATIVE: 1
ENDOCRINE NEGATIVE: 1
ABDOMINAL PAIN: 1

## 2023-10-11 ASSESSMENT — ACTIVITIES OF DAILY LIVING (ADL)
DRESSING YOURSELF: NEEDS ASSISTANCE
FEEDING YOURSELF: INDEPENDENT
JUDGMENT_ADEQUATE_SAFELY_COMPLETE_DAILY_ACTIVITIES: YES
LACK_OF_TRANSPORTATION: PATIENT DECLINED
PATIENT'S MEMORY ADEQUATE TO SAFELY COMPLETE DAILY ACTIVITIES?: YES
HEARING - RIGHT EAR: FUNCTIONAL
GROOMING: NEEDS ASSISTANCE
TOILETING: INDEPENDENT
WALKS IN HOME: INDEPENDENT
HEARING - LEFT EAR: FUNCTIONAL
ADEQUATE_TO_COMPLETE_ADL: YES
BATHING: NEEDS ASSISTANCE

## 2023-10-11 ASSESSMENT — PAIN DESCRIPTION - DESCRIPTORS
DESCRIPTORS: SORE
DESCRIPTORS: SHARP

## 2023-10-11 ASSESSMENT — PAIN - FUNCTIONAL ASSESSMENT
PAIN_FUNCTIONAL_ASSESSMENT: 0-10
PAIN_FUNCTIONAL_ASSESSMENT: UNABLE TO SELF-REPORT
PAIN_FUNCTIONAL_ASSESSMENT: 0-10
PAIN_FUNCTIONAL_ASSESSMENT: UNABLE TO SELF-REPORT
PAIN_FUNCTIONAL_ASSESSMENT: 0-10
PAIN_FUNCTIONAL_ASSESSMENT: UNABLE TO SELF-REPORT
PAIN_FUNCTIONAL_ASSESSMENT: 0-10

## 2023-10-11 ASSESSMENT — PAIN SCALES - GENERAL
PAINLEVEL_OUTOF10: 10 - WORST POSSIBLE PAIN
PAINLEVEL_OUTOF10: 0 - NO PAIN
PAINLEVEL_OUTOF10: 2
PAINLEVEL_OUTOF10: 7
PAINLEVEL_OUTOF10: 5 - MODERATE PAIN
PAINLEVEL_OUTOF10: 0 - NO PAIN

## 2023-10-11 ASSESSMENT — LIFESTYLE VARIABLES
AUDIT-C TOTAL SCORE: 0
HOW OFTEN DO YOU HAVE A DRINK CONTAINING ALCOHOL: NEVER
SKIP TO QUESTIONS 9-10: 1
HOW OFTEN DO YOU HAVE 6 OR MORE DRINKS ON ONE OCCASION: NEVER
HOW MANY STANDARD DRINKS CONTAINING ALCOHOL DO YOU HAVE ON A TYPICAL DAY: PATIENT DOES NOT DRINK
AUDIT-C TOTAL SCORE: 0

## 2023-10-11 ASSESSMENT — COLUMBIA-SUICIDE SEVERITY RATING SCALE - C-SSRS
1. IN THE PAST MONTH, HAVE YOU WISHED YOU WERE DEAD OR WISHED YOU COULD GO TO SLEEP AND NOT WAKE UP?: NO
6. HAVE YOU EVER DONE ANYTHING, STARTED TO DO ANYTHING, OR PREPARED TO DO ANYTHING TO END YOUR LIFE?: NO
2. HAVE YOU ACTUALLY HAD ANY THOUGHTS OF KILLING YOURSELF?: NO

## 2023-10-11 NOTE — PROGRESS NOTES
"Leanna Carter is a 78 y.o. female on day 0 of admission presenting with Chest pain.    Subjective   Resting in bed. No complaints. Pain has mostly resolved at this time. Leg swelling is signifcantly decreased today, reports hasn't been able to see her ankles in 3 weeks        Objective     Physical Exam  Constitutional:       Appearance: She is obese.   Cardiovascular:      Rate and Rhythm: Normal rate and regular rhythm.      Pulses: Normal pulses.      Heart sounds: Normal heart sounds. No murmur heard.     No gallop.   Pulmonary:      Effort: Pulmonary effort is normal. No respiratory distress.      Breath sounds: Normal breath sounds. Expiratory wheezing noted     Comments: Diminished bilaterally  Abdominal:      General: Abdomen is flat. There is no distension.      Palpations: Abdomen is soft.      Tenderness: epigastric and right upper quad tenderness. There is no guarding.   Musculoskeletal:      Right lower leg: trace Edema present.      Left lower leg: trace Edema present.   Skin:     General: Skin is warm.      Capillary Refill: Capillary refill takes less than 2 seconds.   Neurological:      Mental Status: She is alert and oriented to person, place, and time.   Psychiatric:         Mood and Affect: Mood normal.         Thought Content: Thought content normal.         Judgment: Judgment normal.     Last Recorded Vitals  Blood pressure 148/86, pulse 85, temperature 37.1 °C (98.7 °F), temperature source Temporal, resp. rate 18, height 1.65 m (5' 4.96\"), weight 83 kg (182 lb 15.7 oz), SpO2 96 %.  Intake/Output last 3 Shifts:  No intake/output data recorded.    Relevant Results.  Scheduled medications  amLODIPine, 10 mg, oral, Daily  atorvastatin, 80 mg, oral, Daily  clopidogrel, 75 mg, oral, Daily  docusate sodium, 100 mg, oral, BID  furosemide, 40 mg, intravenous, BID  [Held by provider] furosemide, 40 mg, oral, BID  [Held by provider] heparin (porcine), 5,000 Units, subcutaneous, q8h BRODY  insulin lispro, " 0-5 Units, subcutaneous, Before meals & nightly  pantoprazole, 40 mg, oral, Daily before breakfast  perflutren lipid microspheres, 3 mL of dilution, intravenous, Once in imaging  perflutren protein A microsphere, 0.5 mL, intravenous, Once in imaging  sulfur hexafluoride microsphr, 2 mL, intravenous, Once in imaging      Continuous medications     PRN medications  PRN medications: dextrose 10 % in water (D10W), dextrose, glucagon, polyethylene glycol, traMADol     Results for orders placed or performed during the hospital encounter of 10/10/23 (from the past 24 hour(s))   CBC and Auto Differential   Result Value Ref Range    WBC 9.2 4.4 - 11.3 x10*3/uL    nRBC 0.0 0.0 - 0.0 /100 WBCs    RBC 4.13 4.00 - 5.20 x10*6/uL    Hemoglobin 11.7 (L) 12.0 - 16.0 g/dL    Hematocrit 35.1 (L) 36.0 - 46.0 %    MCV 85 80 - 100 fL    MCH 28.3 26.0 - 34.0 pg    MCHC 33.3 32.0 - 36.0 g/dL    RDW 14.1 11.5 - 14.5 %    Platelets 261 150 - 450 x10*3/uL    MPV 11.0 7.5 - 11.5 fL    Neutrophils % 59.7 40.0 - 80.0 %    Immature Granulocytes %, Automated 0.5 0.0 - 0.9 %    Lymphocytes % 29.6 13.0 - 44.0 %    Monocytes % 6.1 2.0 - 10.0 %    Eosinophils % 3.3 0.0 - 6.0 %    Basophils % 0.8 0.0 - 2.0 %    Neutrophils Absolute 5.52 (H) 1.60 - 5.50 x10*3/uL    Immature Granulocytes Absolute, Automated 0.05 0.00 - 0.50 x10*3/uL    Lymphocytes Absolute 2.73 0.80 - 3.00 x10*3/uL    Monocytes Absolute 0.56 0.05 - 0.80 x10*3/uL    Eosinophils Absolute 0.30 0.00 - 0.40 x10*3/uL    Basophils Absolute 0.07 0.00 - 0.10 x10*3/uL   Comprehensive Metabolic Panel   Result Value Ref Range    Glucose 166 (H) 74 - 99 mg/dL    Sodium 139 136 - 145 mmol/L    Potassium 3.4 (L) 3.5 - 5.3 mmol/L    Chloride 102 98 - 107 mmol/L    Bicarbonate 24 21 - 32 mmol/L    Anion Gap 16 10 - 20 mmol/L    Urea Nitrogen 27 (H) 6 - 23 mg/dL    Creatinine 2.37 (H) 0.50 - 1.05 mg/dL    eGFR 21 (L) >60 mL/min/1.73m*2    Calcium 9.3 8.6 - 10.3 mg/dL    Albumin 4.4 3.4 - 5.0 g/dL     Alkaline Phosphatase 136 33 - 136 U/L    Total Protein 8.2 6.4 - 8.2 g/dL    AST 15 9 - 39 U/L    Bilirubin, Total 0.5 0.0 - 1.2 mg/dL    ALT 11 7 - 45 U/L   Magnesium   Result Value Ref Range    Magnesium 1.50 (L) 1.60 - 2.40 mg/dL   B-type natriuretic peptide   Result Value Ref Range    BNP 56 0 - 99 pg/mL   Troponin I, High Sensitivity, Initial   Result Value Ref Range    Troponin I, High Sensitivity 7 0 - 13 ng/L   Troponin, High Sensitivity, 1 Hour   Result Value Ref Range    Troponin I, High Sensitivity 6 0 - 13 ng/L   D-dimer, VTE Exclusion   Result Value Ref Range    D-Dimer, Quantitative VTE Exclusion 10,486 (H) <=500 ng/mL FEU   Troponin I, High Sensitivity   Result Value Ref Range    Troponin I, High Sensitivity 7 0 - 13 ng/L   POCT GLUCOSE   Result Value Ref Range    POCT Glucose 137 (H) 74 - 99 mg/dL   Troponin I, High Sensitivity   Result Value Ref Range    Troponin I, High Sensitivity 7 0 - 13 ng/L   Basic metabolic panel   Result Value Ref Range    Glucose 124 (H) 74 - 99 mg/dL    Sodium 142 136 - 145 mmol/L    Potassium 3.6 3.5 - 5.3 mmol/L    Chloride 106 98 - 107 mmol/L    Bicarbonate 26 21 - 32 mmol/L    Anion Gap 14 10 - 20 mmol/L    Urea Nitrogen 23 6 - 23 mg/dL    Creatinine 2.16 (H) 0.50 - 1.05 mg/dL    eGFR 23 (L) >60 mL/min/1.73m*2    Calcium 9.0 8.6 - 10.3 mg/dL   CBC and Auto Differential   Result Value Ref Range    WBC 7.6 4.4 - 11.3 x10*3/uL    nRBC 0.0 0.0 - 0.0 /100 WBCs    RBC 3.73 (L) 4.00 - 5.20 x10*6/uL    Hemoglobin 10.5 (L) 12.0 - 16.0 g/dL    Hematocrit 31.9 (L) 36.0 - 46.0 %    MCV 86 80 - 100 fL    MCH 28.2 26.0 - 34.0 pg    MCHC 32.9 32.0 - 36.0 g/dL    RDW 14.1 11.5 - 14.5 %    Platelets 220 150 - 450 x10*3/uL    MPV 10.5 7.5 - 11.5 fL    Neutrophils % 56.4 40.0 - 80.0 %    Immature Granulocytes %, Automated 0.4 0.0 - 0.9 %    Lymphocytes % 31.8 13.0 - 44.0 %    Monocytes % 6.4 2.0 - 10.0 %    Eosinophils % 4.3 0.0 - 6.0 %    Basophils % 0.7 0.0 - 2.0 %    Neutrophils  Absolute 4.29 1.60 - 5.50 x10*3/uL    Immature Granulocytes Absolute, Automated 0.03 0.00 - 0.50 x10*3/uL    Lymphocytes Absolute 2.42 0.80 - 3.00 x10*3/uL    Monocytes Absolute 0.49 0.05 - 0.80 x10*3/uL    Eosinophils Absolute 0.33 0.00 - 0.40 x10*3/uL    Basophils Absolute 0.05 0.00 - 0.10 x10*3/uL   POCT GLUCOSE   Result Value Ref Range    POCT Glucose 129 (H) 74 - 99 mg/dL   Transthoracic Echo (TTE) Complete   Result Value Ref Range    BSA 1.95 m2   POCT GLUCOSE   Result Value Ref Range    POCT Glucose 110 (H) 74 - 99 mg/dL        US gallbladder    Result Date: 10/11/2023  Interpreted By:  Nolberto Rai, STUDY: US GALLBLADDER;  10/11/2023 3:00 am   INDICATION: Signs/Symptoms:Epigastric pain, gallbladder distended with sludge/stones on CT.   COMPARISON: None.   ACCESSION NUMBER(S): [id number]   ORDERING CLINICIAN: ORAL FITCH   TECHNIQUE: Multiple sonographic grayscale and color images of the right upper quadrant were performed.   FINDINGS: The liver demonstrates normal echogenicity. 1.3 cm cyst in the left hepatic lobe. There is cholelithiasis and gallbladder sludge. There is distention of the gallbladder. No evidence of gallbladder wall thickening. Per the sonographer, sonographic Jeffrey sign is however positive. The common bile duct measures 7 mm in diameter, upper limits normal for patient's age.   There is obscuration of the pancreas secondary to shadowing from overlying bowel gas.   The right kidney measures 11.7 cm in length. No right hydronephrosis.       Gallbladder sludge and cholelithiasis. There is distention of the gallbladder. There is no evidence of gallbladder wall thickening or pericholecystic edema. Per the sonographer, sonographic Jeffrey sign is however positive and clinical correlation is recommended if there is concern for acute cholecystitis, and HIDA scan may be obtained for further evaluation.     MACRO: None   Signed by: Nolberto Rai 10/11/2023 3:30 AM Dictation workstation:    [id number]    CT chest abdomen pelvis wo IV contrast    Result Date: 10/11/2023  Interpreted By:  Nolberto Rai, STUDY: CT CHEST ABDOMEN PELVIS WO CONTRAST;  10/11/2023 12:19 am   INDICATION: Signs/Symptoms:chest pain, assess for fluid overload.   COMPARISON: 8/18/2022   ACCESSION NUMBER(S): [id number]   ORDERING CLINICIAN: DENVER MYERS   TECHNIQUE: Contiguous axial images of the chest, abdomen and pelvis were obtained without intravenous contrast. Coronal and sagittal reformatted images were obtained from the axial images.   FINDINGS: CT CHEST:   The examination is limited secondary to lack of intravenous contrast.   No axillary or mediastinal lymphadenopathy. There is limited evaluation for hilar lymphadenopathy on noncontrast examination. The heart is normal in size. Coronary artery atherosclerotic calcifications.   No airspace consolidation or pleural effusion. No pneumothorax.   Multilevel degenerative change with diffuse osseous bridging spurring of the thoracic spine.   CT ABDOMEN PELVIS:   Evaluation of the abdominal viscera is limited secondary lack of intravenous contrast. Limited evaluation for liver mass on noncontrast examination. 1.4 cm fluid attenuation lesion in the left hepatic lobe may correspond to a cyst. There is layering density in the gallbladder compatible with sludge or stones.   The pancreas, spleen, and adrenal glands appear unremarkable.   Evaluation of the kidneys is limited secondary to lack of intravenous contrast. 1.8 cm cyst in the medial left kidney. There is also a stable indeterminate 10 mm lesion in the upper pole the left kidney. Bilateral renal vascular calcifications. No hydronephrosis.   Atherosclerotic calcification of the abdominal aorta and bilateral iliac arteries. Stable 2.6 cm infrarenal abdominal aortic aneurysm.   No evidence of bowel obstruction.   No significant free abdominal or pelvic fluid.   Urinary bladder is underdistended and not well evaluated.    There is redemonstration of enlargement and lobularity of the uterus with calcifications compatible with fibroids.   Multilevel degenerative change of the lumbar spine.       No airspace consolidation or pleural effusion.   No significant free abdominal or pelvic fluid.   Layering density in the gallbladder may correspond to sludge and/or stones.   Fibroid uterus.   MACRO: None   Signed by: Nolberto Rai 10/11/2023 1:40 AM Dictation workstation:   [id number]    XR chest 1 view    Result Date: 10/10/2023  STUDY: Chest Radiograph;  10/10/2023 4:12 PM INDICATION: Chest pain. COMPARISON: XR chest 08/18/2022. ACCESSION NUMBER(S): [id number] ORDERING CLINICIAN: BARAK PATRICIO TECHNIQUE:  Frontal chest was obtained at 1535 hours. FINDINGS: CARDIOMEDIASTINAL SILHOUETTE: Cardiomediastinal silhouette is normal in size and configuration.  LUNGS: Lungs are clear. There is no evidence of pleural effusion or pneumothorax.  ABDOMEN: No remarkable upper abdominal findings.  BONES: No acute osseous changes.    No acute cardiopulmonary disease. Signed by Chuck Padron MD         Assessment/Plan   Principal Problem:    Chest pain  Active Problems:    Anemia in chronic kidney disease    CKD (chronic kidney disease) stage 4, GFR 15-29 ml/min (CMS/HCC)    Type 2 diabetes mellitus with stage 4 chronic kidney disease, with long-term current use of insulin (CMS/[facility])    Essential hypertension    Mixed hyperlipidemia    NAFLD (nonalcoholic fatty liver disease)    Obstructive sleep apnea syndrome    Epigastric pain    Elevated d-dimer    Cholecystitis      Leanna Carter is a 78 y.o. female presenting with chest pain.  Patient reports that the pain initially started yesterday.  Last night around 7 PM was the first occurrence.  She was getting ready to go to her [employer] for service and the chest pain lasted throughout the service.  Her blood pressure was checked during that time and it was reported that her systolic BP was around 180.  She  reports that lasted about 1 hour and then resolved.  However it reoccurred this morning.  She reports that she went to the podiatry and then went out to lunch at [facility] and after she sat down she started having the chest pain.  The chest pain was the same and best described as a sharp pain but at a higher intensity in comparison to yesterday.  She describes the pain as being midsternal.  She reports that since the chest pain started this afternoon and has been intermittent and does not recognize any triggering factors such as movement.  She also endorses worsening bilateral lower extremity edema recently.  She endorses shortness of breath and lightheadedness.  However the lightheadedness has been ongoing since before the chest pain started.  She denies any nausea, jaw pain, diaphoresis, however she does endorse some left-sided arm pain today.  She does have a past medical history of MGUS (follows with hematology at [facility]), anemia, diabetes, cirrhosis , CKD, CVA, HFpEF, Bell's palsy, and hypertension.  She denies ever having a heart attack previously.     It is noted that patient is not aware of the medication she takes at home, she is able to recognize the atenolol and the Plavix but not others.  She does not have a med list on her at this time.  Med list was taken from recent visit located in epic.      Chest pain  -Atypical, describes as midsternal and stabbing  -Likely pleuritic, is reproducible on exam.  Worsening when pressing midsternally.  -BNP negative at 56  -Troponins negative x2, recheck in a.m.  -Chest x-ray negative  -Add D-dimer - 10, 000  -Echocardiogram, last echo noted to be in 2019 - done, not read yet   -Due to pain being reproducible and patient reports severity of pain, CT chest abdomen pelvis ordered.  She does have cirrhosis.  Due to body habitus unable to tell for possible ascites on exam.   -Consider cardiology consult, chest pain is reproducible at this time which is atypical  -Does have  some bilateral lower extremity edema, will give few doses of IV Lasix.  Will need to monitor kidney function on Lasix  -Telemetry  - vq scan as can't have ct pe dt creat   - us lower ext dvt rule out   - covid swab   Surgery consult - plan to take for lap zechariah add on today. Npo, hold heparin.   Gi consult - no further workup, defer to surgery   - ct a/p concern for gall sludge/stones.  - us gallbladder - enlarged, + contreras sign, sludge/stones      Diabetes  -Does not remember how much insulin she is on at home or what insulin she is on at home  -Correctional sliding scale for now  -ACHS Accu-Cheks     CKD stg 4   -Creatinine 2.37  -At baseline, has been stable since November 2022  -Avoid nephrotoxic agents     History of CVA  -Continue statin and Plavix     DVT prophylaxis:  Heparin, SCD     DC plan:  DC home when medically stable    Labs/Testing reviewed:   Interdisciplinary team rounding completed with hospitalist, nurse, TCC  NP discussed plan & lab/testing results with Dr. mckeon  --- pending lap zechariah add on today , vq, us dvt   45 min spent on professional & overall care of this patient    Maria G Cabrales, APRN-CNP

## 2023-10-11 NOTE — OP NOTE
Cholecystectomy Laparoscopy Operative Note     Date: 10/11/2023  OR Location: [facility] OR    Name: Leanna Carter, : 1945, Age: 78 y.o., MRN: [id number], Sex: female    Diagnosis  Pre-op Diagnosis     * Chest pain, unspecified type [R07.9] Acute cholecystitis     Procedures  Cholecystectomy Laparoscopy  [id number] - MD LAPAROSCOPY SURG CHOLECYSTECTOMY      Surgeons      * Usman Nathan - Primary    Resident/Fellow/Other Assistant:  No surgical staff documented.    Procedure Summary  Anesthesia: General  ASA: III  Anesthesia Staff: Anesthesiologist: Nolberto Joiner MD  C-AA: EMELYN Forrest  Estimated Blood Loss: 10mL  Intra-op Medications:   Medication Name Total Dose   BUPivacaine HCl (Marcaine) 0.5 % (5 mg/mL) injection 17 mL   sodium chloride 0.9 % irrigation solution 1,000 mL              Anesthesia Record               Intraprocedure I/O Totals          Intake    .00 mL    Total Intake 700 mL          Specimen:   ID Type Source Tests Collected by Time   1 : GALLBLADDER AND CONTENTS Tissue GALLBLADDER CHOLECYSTECTOMY SURGICAL PATHOLOGY EXAM Usman Nathan MD 10/11/2023 1722        Staff:   Circulator: Soo Gotti RN; Tg Lee RN  Relief Circulator: Karishma Goetz RN  Relief Scrub: Cait Verdugo  Scrub Person: Emely Lowry; Rafaela Lane         Drains and/or Catheters: * None in log *    Tourniquet Times:         Implants:     Findings: Distended dilated gallbladder    Indications: Leanna Carter is an 78 y.o. female who is having surgery for Chest pain, unspecified type [R07.9].     The patient was seen in the preoperative area. The risks, benefits, complications, treatment options, non-operative alternatives, expected recovery and outcomes were discussed with the patient. The possibilities of reaction to medication, pulmonary aspiration, injury to surrounding structures, bleeding, recurrent infection, the need for additional procedures, failure to diagnose a condition, and  creating a complication requiring transfusion or operation were discussed with the patient. The patient concurred with the proposed plan, giving informed consent.  The site of surgery was properly noted/marked if necessary per policy. The patient has been actively warmed in preoperative area. Preoperative antibiotics are not indicated. Venous thrombosis prophylaxis have been ordered including bilateral sequential compression devices    Procedure Details: Patient brought to the operating room placed supine.  Timeout is performed confirm patient procedure.  Antibiotics were given general anesthesia ministered through the endotracheal tube.  We prepped and draped sterilely.  Injected local made a supraumbilical incision with a scalpel.  Fascia was incised and entered peritoneal cavity the balloon port.  We insufflated and placed a 5 mm 30 degree camera.  Placed 5 mm port in the right upper quadrant another in the subxiphoid midline.  Gallbladder was noted to be encased in omental adhesions.  These were meticulously taken down using cautery and blunt dissection.  The gallbladder was noted to be very distended and dilated.  It was grasped and retracted cephalad into the right.  Meticulous dissection performed in Calot's triangle.  Cystic artery and cystic duct were delineated.  The artery was lying immediately anterior to the cystic duct.  Posterior cystic plate was visualized.  The artery was divided between Hem-o-kg clips.  I put 2 clips on the janet hepatis side of the cystic duct 1 toward gallbladder and divided with EndoShears.  The gallbladder was then dissected out of the liver bed with hook cautery.  We irrigated make sure everything was hemostatic.  Some Marlee topical hemostatic agent was sprayed in the liver bed.  The ports were removed under direct visualization we desufflated.  Closed umbilical fascia with 0 Vicryl.  Skin was closed with 4-0 Biosyn and Dermabond.  Patient recovery in satisfactory  condition    Complications:  None; patient tolerated the procedure well.    Disposition: PACU - hemodynamically stable.  Condition: stable         Additional Details: Not applicable    Attending Attestation: I was present and scrubbed for the entire procedure.    Usman Nathan  Phone Number: [phone]

## 2023-10-11 NOTE — ANESTHESIA PROCEDURE NOTES
Airway  Date/Time: 10/11/2023 5:06 PM  Urgency: elective    Airway not difficult    Staffing  Performed: EMELYN   Authorized by: Jeff Abbott MD    Performed by: EMELYN Forrest  Patient location during procedure: OR    Indications and Patient Condition  Indications for airway management: anesthesia  Patient position: sniffing      Final Airway Details  Final airway type: endotracheal airway      Successful airway: ETT  Cuffed: yes   Successful intubation technique: direct laryngoscopy  Blade: Franky  Blade size: #3  ETT size (mm): 7.0  Cormack-Lehane Classification: grade I - full view of glottis  Placement verified by: chest auscultation and capnometry   Cuff volume (mL): 21  Measured from: lips

## 2023-10-11 NOTE — H&P (VIEW-ONLY)
"Reason For Consult  Chest pain,  gallstones     History Of Present Illness  Leanna Carter is a 78 y.o. female with multiple medical comorbid conditions.  She was in her usual state of health when she noticed sudden onset of some sharp chest pain when going to [facility] on Monday.  At [facility], a lady in a [employer] checked her blood pressure which was quite high.  Her pain resolved but then  yesterday after eating a meal at [employer] she had a similar episode of chest pain which prompted her to come to the emergency department.  Work-up included CT scan and right upper quadrant ultrasound that shows a distended gallbladder with sludge.  She feels better at this point.  She is being ruled out for cardiac event.  General surgery asked to evaluate for concern of biliary colic     Past Medical History  HFpEF (EF 60-65% in 2019), DM, HTN, psoriasis, nonalcoholic liver cirrhosis, prior stroke on clopidogrel, COPD, CKD    Past Surgical History  Knee Surgery, hand surgery.  No previous abdominal surgery       Social History  She reports that she has quit smoking. Her smoking use included cigarettes. She has been exposed to tobacco smoke. She has never used smokeless tobacco. She reports that she does not drink alcohol and does not use drugs.    Family History  Family History   Problem Relation Name Age of Onset    COPD Mother      Diabetes Mother      Hypertension Mother      Diabetes Sister      Hypertension Sister      Aneurysm Maternal Grandmother          Allergies  Oxycodone and Phenytoin sodium extended      Physical Exam  Moderately obese, comfortable  Lungs clear  Abdomen soft, slightly distended, mild right upper quadrant tenderness  Neurologic exam nonfocal     Last Recorded Vitals  Blood pressure 135/84, pulse 79, temperature 36.5 °C (97.7 °F), temperature source Temporal, resp. rate 18, height 1.65 m (5' 4.96\"), weight 83 kg (182 lb 15.7 oz), SpO2 98 %.    Relevant Results    Results for orders placed or " performed during the hospital encounter of 10/10/23 (from the past 96 hour(s))   CBC and Auto Differential   Result Value Ref Range    WBC 9.2 4.4 - 11.3 x10*3/uL    nRBC 0.0 0.0 - 0.0 /100 WBCs    RBC 4.13 4.00 - 5.20 x10*6/uL    Hemoglobin 11.7 (L) 12.0 - 16.0 g/dL    Hematocrit 35.1 (L) 36.0 - 46.0 %    MCV 85 80 - 100 fL    MCH 28.3 26.0 - 34.0 pg    MCHC 33.3 32.0 - 36.0 g/dL    RDW 14.1 11.5 - 14.5 %    Platelets 261 150 - 450 x10*3/uL    MPV 11.0 7.5 - 11.5 fL    Neutrophils % 59.7 40.0 - 80.0 %    Immature Granulocytes %, Automated 0.5 0.0 - 0.9 %    Lymphocytes % 29.6 13.0 - 44.0 %    Monocytes % 6.1 2.0 - 10.0 %    Eosinophils % 3.3 0.0 - 6.0 %    Basophils % 0.8 0.0 - 2.0 %    Neutrophils Absolute 5.52 (H) 1.60 - 5.50 x10*3/uL    Immature Granulocytes Absolute, Automated 0.05 0.00 - 0.50 x10*3/uL    Lymphocytes Absolute 2.73 0.80 - 3.00 x10*3/uL    Monocytes Absolute 0.56 0.05 - 0.80 x10*3/uL    Eosinophils Absolute 0.30 0.00 - 0.40 x10*3/uL    Basophils Absolute 0.07 0.00 - 0.10 x10*3/uL   Comprehensive Metabolic Panel   Result Value Ref Range    Glucose 166 (H) 74 - 99 mg/dL    Sodium 139 136 - 145 mmol/L    Potassium 3.4 (L) 3.5 - 5.3 mmol/L    Chloride 102 98 - 107 mmol/L    Bicarbonate 24 21 - 32 mmol/L    Anion Gap 16 10 - 20 mmol/L    Urea Nitrogen 27 (H) 6 - 23 mg/dL    Creatinine 2.37 (H) 0.50 - 1.05 mg/dL    eGFR 21 (L) >60 mL/min/1.73m*2    Calcium 9.3 8.6 - 10.3 mg/dL    Albumin 4.4 3.4 - 5.0 g/dL    Alkaline Phosphatase 136 33 - 136 U/L    Total Protein 8.2 6.4 - 8.2 g/dL    AST 15 9 - 39 U/L    Bilirubin, Total 0.5 0.0 - 1.2 mg/dL    ALT 11 7 - 45 U/L   Magnesium   Result Value Ref Range    Magnesium 1.50 (L) 1.60 - 2.40 mg/dL   B-type natriuretic peptide   Result Value Ref Range    BNP 56 0 - 99 pg/mL   Troponin I, High Sensitivity, Initial   Result Value Ref Range    Troponin I, High Sensitivity 7 0 - 13 ng/L   Troponin, High Sensitivity, 1 Hour   Result Value Ref Range    Troponin I, High  Sensitivity 6 0 - 13 ng/L   D-dimer, VTE Exclusion   Result Value Ref Range    D-Dimer, Quantitative VTE Exclusion 10,486 (H) <=500 ng/mL FEU   Troponin I, High Sensitivity   Result Value Ref Range    Troponin I, High Sensitivity 7 0 - 13 ng/L   POCT GLUCOSE   Result Value Ref Range    POCT Glucose 137 (H) 74 - 99 mg/dL   Troponin I, High Sensitivity   Result Value Ref Range    Troponin I, High Sensitivity 7 0 - 13 ng/L   Basic metabolic panel   Result Value Ref Range    Glucose 124 (H) 74 - 99 mg/dL    Sodium 142 136 - 145 mmol/L    Potassium 3.6 3.5 - 5.3 mmol/L    Chloride 106 98 - 107 mmol/L    Bicarbonate 26 21 - 32 mmol/L    Anion Gap 14 10 - 20 mmol/L    Urea Nitrogen 23 6 - 23 mg/dL    Creatinine 2.16 (H) 0.50 - 1.05 mg/dL    eGFR 23 (L) >60 mL/min/1.73m*2    Calcium 9.0 8.6 - 10.3 mg/dL   CBC and Auto Differential   Result Value Ref Range    WBC 7.6 4.4 - 11.3 x10*3/uL    nRBC 0.0 0.0 - 0.0 /100 WBCs    RBC 3.73 (L) 4.00 - 5.20 x10*6/uL    Hemoglobin 10.5 (L) 12.0 - 16.0 g/dL    Hematocrit 31.9 (L) 36.0 - 46.0 %    MCV 86 80 - 100 fL    MCH 28.2 26.0 - 34.0 pg    MCHC 32.9 32.0 - 36.0 g/dL    RDW 14.1 11.5 - 14.5 %    Platelets 220 150 - 450 x10*3/uL    MPV 10.5 7.5 - 11.5 fL    Neutrophils % 56.4 40.0 - 80.0 %    Immature Granulocytes %, Automated 0.4 0.0 - 0.9 %    Lymphocytes % 31.8 13.0 - 44.0 %    Monocytes % 6.4 2.0 - 10.0 %    Eosinophils % 4.3 0.0 - 6.0 %    Basophils % 0.7 0.0 - 2.0 %    Neutrophils Absolute 4.29 1.60 - 5.50 x10*3/uL    Immature Granulocytes Absolute, Automated 0.03 0.00 - 0.50 x10*3/uL    Lymphocytes Absolute 2.42 0.80 - 3.00 x10*3/uL    Monocytes Absolute 0.49 0.05 - 0.80 x10*3/uL    Eosinophils Absolute 0.33 0.00 - 0.40 x10*3/uL    Basophils Absolute 0.05 0.00 - 0.10 x10*3/uL   POCT GLUCOSE   Result Value Ref Range    POCT Glucose 129 (H) 74 - 99 mg/dL   Transthoracic Echo (TTE) Complete   Result Value Ref Range    BSA 1.95 m2      Assessment/Plan     78-year-old female with  multiple medical conditions.  Has had 2 episodes of cute onset of chest pain shortly after meals.  Thus far cardiac work-up negative.  Although atypical,  it is likely that her symptoms are related to symptomatic gallstones.  I have recommended laparoscopic cholecystectomy.  We will place her on the OR schedule for later today      I spent 45 minutes in the professional and overall care of this patient.      Orlando Biggs MD

## 2023-10-11 NOTE — CONSULTS
"Reason For Consult  Chest pain,  gallstones     History Of Present Illness  Leanna Carter is a 78 y.o. female with multiple medical comorbid conditions.  She was in her usual state of health when she noticed sudden onset of some sharp chest pain when going to [employer] on Monday.  At [employer], a lady in a [employer] checked her blood pressure which was quite high.  Her pain resolved but then  yesterday after eating a meal at [employer] she had a similar episode of chest pain which prompted her to come to the emergency department.  Work-up included CT scan and right upper quadrant ultrasound that shows a distended gallbladder with sludge.  She feels better at this point.  She is being ruled out for cardiac event.  General surgery asked to evaluate for concern of biliary colic     Past Medical History  HFpEF (EF 60-65% in 2019), DM, HTN, psoriasis, nonalcoholic liver cirrhosis, prior stroke on clopidogrel, COPD, CKD    Past Surgical History  Knee Surgery, hand surgery.  No previous abdominal surgery       Social History  She reports that she has quit smoking. Her smoking use included cigarettes. She has been exposed to tobacco smoke. She has never used smokeless tobacco. She reports that she does not drink alcohol and does not use drugs.    Family History  Family History   Problem Relation Name Age of Onset    COPD Mother      Diabetes Mother      Hypertension Mother      Diabetes Sister      Hypertension Sister      Aneurysm Maternal Grandmother          Allergies  Oxycodone and Phenytoin sodium extended      Physical Exam  Moderately obese, comfortable  Lungs clear  Abdomen soft, slightly distended, mild right upper quadrant tenderness  Neurologic exam nonfocal     Last Recorded Vitals  Blood pressure 135/84, pulse 79, temperature 36.5 °C (97.7 °F), temperature source Temporal, resp. rate 18, height 1.65 m (5' 4.96\"), weight 83 kg (182 lb 15.7 oz), SpO2 98 %.    Relevant Results    Results for orders placed or " performed during the hospital encounter of 10/10/23 (from the past 96 hour(s))   CBC and Auto Differential   Result Value Ref Range    WBC 9.2 4.4 - 11.3 x10*3/uL    nRBC 0.0 0.0 - 0.0 /100 WBCs    RBC 4.13 4.00 - 5.20 x10*6/uL    Hemoglobin 11.7 (L) 12.0 - 16.0 g/dL    Hematocrit 35.1 (L) 36.0 - 46.0 %    MCV 85 80 - 100 fL    MCH 28.3 26.0 - 34.0 pg    MCHC 33.3 32.0 - 36.0 g/dL    RDW 14.1 11.5 - 14.5 %    Platelets 261 150 - 450 x10*3/uL    MPV 11.0 7.5 - 11.5 fL    Neutrophils % 59.7 40.0 - 80.0 %    Immature Granulocytes %, Automated 0.5 0.0 - 0.9 %    Lymphocytes % 29.6 13.0 - 44.0 %    Monocytes % 6.1 2.0 - 10.0 %    Eosinophils % 3.3 0.0 - 6.0 %    Basophils % 0.8 0.0 - 2.0 %    Neutrophils Absolute 5.52 (H) 1.60 - 5.50 x10*3/uL    Immature Granulocytes Absolute, Automated 0.05 0.00 - 0.50 x10*3/uL    Lymphocytes Absolute 2.73 0.80 - 3.00 x10*3/uL    Monocytes Absolute 0.56 0.05 - 0.80 x10*3/uL    Eosinophils Absolute 0.30 0.00 - 0.40 x10*3/uL    Basophils Absolute 0.07 0.00 - 0.10 x10*3/uL   Comprehensive Metabolic Panel   Result Value Ref Range    Glucose 166 (H) 74 - 99 mg/dL    Sodium 139 136 - 145 mmol/L    Potassium 3.4 (L) 3.5 - 5.3 mmol/L    Chloride 102 98 - 107 mmol/L    Bicarbonate 24 21 - 32 mmol/L    Anion Gap 16 10 - 20 mmol/L    Urea Nitrogen 27 (H) 6 - 23 mg/dL    Creatinine 2.37 (H) 0.50 - 1.05 mg/dL    eGFR 21 (L) >60 mL/min/1.73m*2    Calcium 9.3 8.6 - 10.3 mg/dL    Albumin 4.4 3.4 - 5.0 g/dL    Alkaline Phosphatase 136 33 - 136 U/L    Total Protein 8.2 6.4 - 8.2 g/dL    AST 15 9 - 39 U/L    Bilirubin, Total 0.5 0.0 - 1.2 mg/dL    ALT 11 7 - 45 U/L   Magnesium   Result Value Ref Range    Magnesium 1.50 (L) 1.60 - 2.40 mg/dL   B-type natriuretic peptide   Result Value Ref Range    BNP 56 0 - 99 pg/mL   Troponin I, High Sensitivity, Initial   Result Value Ref Range    Troponin I, High Sensitivity 7 0 - 13 ng/L   Troponin, High Sensitivity, 1 Hour   Result Value Ref Range    Troponin I, High  Sensitivity 6 0 - 13 ng/L   D-dimer, VTE Exclusion   Result Value Ref Range    D-Dimer, Quantitative VTE Exclusion 10,486 (H) <=500 ng/mL FEU   Troponin I, High Sensitivity   Result Value Ref Range    Troponin I, High Sensitivity 7 0 - 13 ng/L   POCT GLUCOSE   Result Value Ref Range    POCT Glucose 137 (H) 74 - 99 mg/dL   Troponin I, High Sensitivity   Result Value Ref Range    Troponin I, High Sensitivity 7 0 - 13 ng/L   Basic metabolic panel   Result Value Ref Range    Glucose 124 (H) 74 - 99 mg/dL    Sodium 142 136 - 145 mmol/L    Potassium 3.6 3.5 - 5.3 mmol/L    Chloride 106 98 - 107 mmol/L    Bicarbonate 26 21 - 32 mmol/L    Anion Gap 14 10 - 20 mmol/L    Urea Nitrogen 23 6 - 23 mg/dL    Creatinine 2.16 (H) 0.50 - 1.05 mg/dL    eGFR 23 (L) >60 mL/min/1.73m*2    Calcium 9.0 8.6 - 10.3 mg/dL   CBC and Auto Differential   Result Value Ref Range    WBC 7.6 4.4 - 11.3 x10*3/uL    nRBC 0.0 0.0 - 0.0 /100 WBCs    RBC 3.73 (L) 4.00 - 5.20 x10*6/uL    Hemoglobin 10.5 (L) 12.0 - 16.0 g/dL    Hematocrit 31.9 (L) 36.0 - 46.0 %    MCV 86 80 - 100 fL    MCH 28.2 26.0 - 34.0 pg    MCHC 32.9 32.0 - 36.0 g/dL    RDW 14.1 11.5 - 14.5 %    Platelets 220 150 - 450 x10*3/uL    MPV 10.5 7.5 - 11.5 fL    Neutrophils % 56.4 40.0 - 80.0 %    Immature Granulocytes %, Automated 0.4 0.0 - 0.9 %    Lymphocytes % 31.8 13.0 - 44.0 %    Monocytes % 6.4 2.0 - 10.0 %    Eosinophils % 4.3 0.0 - 6.0 %    Basophils % 0.7 0.0 - 2.0 %    Neutrophils Absolute 4.29 1.60 - 5.50 x10*3/uL    Immature Granulocytes Absolute, Automated 0.03 0.00 - 0.50 x10*3/uL    Lymphocytes Absolute 2.42 0.80 - 3.00 x10*3/uL    Monocytes Absolute 0.49 0.05 - 0.80 x10*3/uL    Eosinophils Absolute 0.33 0.00 - 0.40 x10*3/uL    Basophils Absolute 0.05 0.00 - 0.10 x10*3/uL   POCT GLUCOSE   Result Value Ref Range    POCT Glucose 129 (H) 74 - 99 mg/dL   Transthoracic Echo (TTE) Complete   Result Value Ref Range    BSA 1.95 m2      Assessment/Plan     78-year-old female with  multiple medical conditions.  Has had 2 episodes of cute onset of chest pain shortly after meals.  Thus far cardiac work-up negative.  Although atypical,  it is likely that her symptoms are related to symptomatic gallstones.  I have recommended laparoscopic cholecystectomy.  We will place her on the OR schedule for later today      I spent 45 minutes in the professional and overall care of this patient.      Orlando Biggs MD

## 2023-10-11 NOTE — PERIOPERATIVE NURSING NOTE
1754 Pt arrived to pacu bay at this time, report received from OR and anesthesia staff. Phase 1 care started.   1815 pt remains sedated without s/sx of distress.   1827 pt medicated per order for reports of   1845 pt resting quietly, reporting dec in pain.   1930 Report given to room by Reza  1944 Pt taken up to room via transport at this time in stable condition. Report previously sent to receiving RN. All belongings taken with pt to room, family updated on pt status.

## 2023-10-11 NOTE — CONSULTS
Reason For Consult  Cholelithiasis with sonographic jeffrey sign    History Of Present Illness  Leanna Carter is a 78 y.o. female presenting with c/o chest pain. She reports an acute onset of the chest pain after eating lunch on Monday, sharp in nature and with radiation to the left shoulder. She stated her blood pressure was very high, she took her medications and the pain eventually subsided. She had another episode yesterday at dinner and came to ED. Labs on admission showed stable H&H, elevated d dimer, normal liver enzymes. CT c/a/p showed possible gallstones/sludge, no other significant pathology. Rapid response was call last night for worsening of the chest, epigastric and RUQ pain. U/S RUQ showed  gallbladder sludge and cholelithiasis; distention of the gallbladder with no evidence of gallbladder wall thickening or pericholecystic edema; per the sonographer, sonographic Jeffrey sign was positive. She also reported she had BLE edema on admission and it was resolved with lasix.    GI endoscopies    EGD 11/4/2016 Dr Hdz    - Tortuous esophagus, with possible extrinsic                          compression .                         - LA Grade B reflux esophagitis.                         - Normal stomach.                         - Normal examined duodenum.                         - No specimens collected.  Colonoscopy 11/4/2012  - One 5 mm polyp in the descending colon. Resected and retrieved. Pathology showed hyperplastic polyp.                         - Internal hemorrhoids.                         - The examination was otherwise normal.     Past Medical History  Heart failure, diabetes, hypertension, psoriasis, none alcoholic liver cirrhosis, history of CVA, on Plavix, COPD, MGUS    Surgical History  She has a past surgical history that includes Knee surgery (04/24/2014); MR angio head wo IV contrast (4/12/2014); MR angio neck wo IV contrast (4/12/2014); CT angio neck w and wo IV contrast (5/15/2018); CT  angio head w and wo IV contrast (5/15/2018); MR angio head wo IV contrast (6/18/2019); MR angio neck wo IV contrast (6/18/2019); MR angio head wo IV contrast (8/16/2021); MR angio neck wo IV contrast (8/16/2021); MR angio head wo IV contrast (2/4/2017); MR angio neck wo IV contrast (2/4/2017); CT angio head w and wo IV contrast (6/6/2017); and CT angio neck w and wo IV contrast (6/6/2017).     Social History  She reports that she has quit smoking. Her smoking use included cigarettes. She has been exposed to tobacco smoke. She has never used smokeless tobacco. She reports that she does not drink alcohol and does not use drugs.    Family History  Family History   Problem Relation Name Age of Onset    COPD Mother      Diabetes Mother      Hypertension Mother      Diabetes Sister      Hypertension Sister      Aneurysm Maternal Grandmother          Allergies  Oxycodone and Phenytoin sodium extended    Review of Systems  Review of Systems   Constitutional:  Positive for fatigue.   HENT: Negative.     Eyes: Negative.    Respiratory:  Positive for chest tightness.    Cardiovascular:  Positive for chest pain.   Gastrointestinal:  Positive for abdominal pain and constipation.   Endocrine: Negative.    Genitourinary: Negative.    Musculoskeletal: Negative.    Skin: Negative.    Neurological: Negative.    Psychiatric/Behavioral: Negative.            Physical Exam  Physical Exam  Vitals reviewed.   Constitutional:       Appearance: Normal appearance.   HENT:      Head: Normocephalic and atraumatic.      Nose: Nose normal.   Eyes:      Conjunctiva/sclera: Conjunctivae normal.   Cardiovascular:      Rate and Rhythm: Normal rate and regular rhythm.      Heart sounds: Normal heart sounds.   Pulmonary:      Effort: Pulmonary effort is normal.      Breath sounds: Normal breath sounds.   Abdominal:      General: Bowel sounds are normal.      Palpations: Abdomen is soft.      Tenderness: There is abdominal tenderness in the right upper  "quadrant and epigastric area. There is guarding. There is no rebound. Positive signs include Jeffrey's sign.   Musculoskeletal:         General: Normal range of motion.      Cervical back: Neck supple.   Skin:     General: Skin is warm and dry.   Neurological:      General: No focal deficit present.      Mental Status: She is alert and oriented to person, place, and time.            Last Recorded Vitals  Blood pressure 135/84, pulse 79, temperature 36.5 °C (97.7 °F), temperature source Temporal, resp. rate 18, height 1.651 m (5' 5\"), weight 83 kg (183 lb), SpO2 98 %.    Relevant Results  Scheduled medications  amLODIPine, 10 mg, oral, Daily  atorvastatin, 80 mg, oral, Daily  clopidogrel, 75 mg, oral, Daily  docusate sodium, 100 mg, oral, BID  furosemide, 40 mg, intravenous, BID  [Held by provider] furosemide, 40 mg, oral, BID  [Held by provider] heparin (porcine), 5,000 Units, subcutaneous, q8h BRODY  insulin lispro, 0-5 Units, subcutaneous, Before meals & nightly  pantoprazole, 40 mg, oral, Daily before breakfast  perflutren lipid microspheres, 3 mL of dilution, intravenous, Once in imaging  perflutren protein A microsphere, 0.5 mL, intravenous, Once in imaging  sulfur hexafluoride microsphr, 2 mL, intravenous, Once in imaging      Continuous medications     PRN medications  PRN medications: dextrose 10 % in water (D10W), dextrose, glucagon, polyethylene glycol, traMADol    Results for orders placed or performed during the hospital encounter of 10/10/23 (from the past 96 hour(s))   CBC and Auto Differential   Result Value Ref Range    WBC 9.2 4.4 - 11.3 x10*3/uL    nRBC 0.0 0.0 - 0.0 /100 WBCs    RBC 4.13 4.00 - 5.20 x10*6/uL    Hemoglobin 11.7 (L) 12.0 - 16.0 g/dL    Hematocrit 35.1 (L) 36.0 - 46.0 %    MCV 85 80 - 100 fL    MCH 28.3 26.0 - 34.0 pg    MCHC 33.3 32.0 - 36.0 g/dL    RDW 14.1 11.5 - 14.5 %    Platelets 261 150 - 450 x10*3/uL    MPV 11.0 7.5 - 11.5 fL    Neutrophils % 59.7 40.0 - 80.0 %    Immature " Granulocytes %, Automated 0.5 0.0 - 0.9 %    Lymphocytes % 29.6 13.0 - 44.0 %    Monocytes % 6.1 2.0 - 10.0 %    Eosinophils % 3.3 0.0 - 6.0 %    Basophils % 0.8 0.0 - 2.0 %    Neutrophils Absolute 5.52 (H) 1.60 - 5.50 x10*3/uL    Immature Granulocytes Absolute, Automated 0.05 0.00 - 0.50 x10*3/uL    Lymphocytes Absolute 2.73 0.80 - 3.00 x10*3/uL    Monocytes Absolute 0.56 0.05 - 0.80 x10*3/uL    Eosinophils Absolute 0.30 0.00 - 0.40 x10*3/uL    Basophils Absolute 0.07 0.00 - 0.10 x10*3/uL   Comprehensive Metabolic Panel   Result Value Ref Range    Glucose 166 (H) 74 - 99 mg/dL    Sodium 139 136 - 145 mmol/L    Potassium 3.4 (L) 3.5 - 5.3 mmol/L    Chloride 102 98 - 107 mmol/L    Bicarbonate 24 21 - 32 mmol/L    Anion Gap 16 10 - 20 mmol/L    Urea Nitrogen 27 (H) 6 - 23 mg/dL    Creatinine 2.37 (H) 0.50 - 1.05 mg/dL    eGFR 21 (L) >60 mL/min/1.73m*2    Calcium 9.3 8.6 - 10.3 mg/dL    Albumin 4.4 3.4 - 5.0 g/dL    Alkaline Phosphatase 136 33 - 136 U/L    Total Protein 8.2 6.4 - 8.2 g/dL    AST 15 9 - 39 U/L    Bilirubin, Total 0.5 0.0 - 1.2 mg/dL    ALT 11 7 - 45 U/L   Magnesium   Result Value Ref Range    Magnesium 1.50 (L) 1.60 - 2.40 mg/dL   B-type natriuretic peptide   Result Value Ref Range    BNP 56 0 - 99 pg/mL   Troponin I, High Sensitivity, Initial   Result Value Ref Range    Troponin I, High Sensitivity 7 0 - 13 ng/L   Troponin, High Sensitivity, 1 Hour   Result Value Ref Range    Troponin I, High Sensitivity 6 0 - 13 ng/L   D-dimer, VTE Exclusion   Result Value Ref Range    D-Dimer, Quantitative VTE Exclusion 10,486 (H) <=500 ng/mL FEU   Troponin I, High Sensitivity   Result Value Ref Range    Troponin I, High Sensitivity 7 0 - 13 ng/L   POCT GLUCOSE   Result Value Ref Range    POCT Glucose 137 (H) 74 - 99 mg/dL   Troponin I, High Sensitivity   Result Value Ref Range    Troponin I, High Sensitivity 7 0 - 13 ng/L   Basic metabolic panel   Result Value Ref Range    Glucose 124 (H) 74 - 99 mg/dL    Sodium 142  136 - 145 mmol/L    Potassium 3.6 3.5 - 5.3 mmol/L    Chloride 106 98 - 107 mmol/L    Bicarbonate 26 21 - 32 mmol/L    Anion Gap 14 10 - 20 mmol/L    Urea Nitrogen 23 6 - 23 mg/dL    Creatinine 2.16 (H) 0.50 - 1.05 mg/dL    eGFR 23 (L) >60 mL/min/1.73m*2    Calcium 9.0 8.6 - 10.3 mg/dL   CBC and Auto Differential   Result Value Ref Range    WBC 7.6 4.4 - 11.3 x10*3/uL    nRBC 0.0 0.0 - 0.0 /100 WBCs    RBC 3.73 (L) 4.00 - 5.20 x10*6/uL    Hemoglobin 10.5 (L) 12.0 - 16.0 g/dL    Hematocrit 31.9 (L) 36.0 - 46.0 %    MCV 86 80 - 100 fL    MCH 28.2 26.0 - 34.0 pg    MCHC 32.9 32.0 - 36.0 g/dL    RDW 14.1 11.5 - 14.5 %    Platelets 220 150 - 450 x10*3/uL    MPV 10.5 7.5 - 11.5 fL    Neutrophils % 56.4 40.0 - 80.0 %    Immature Granulocytes %, Automated 0.4 0.0 - 0.9 %    Lymphocytes % 31.8 13.0 - 44.0 %    Monocytes % 6.4 2.0 - 10.0 %    Eosinophils % 4.3 0.0 - 6.0 %    Basophils % 0.7 0.0 - 2.0 %    Neutrophils Absolute 4.29 1.60 - 5.50 x10*3/uL    Immature Granulocytes Absolute, Automated 0.03 0.00 - 0.50 x10*3/uL    Lymphocytes Absolute 2.42 0.80 - 3.00 x10*3/uL    Monocytes Absolute 0.49 0.05 - 0.80 x10*3/uL    Eosinophils Absolute 0.33 0.00 - 0.40 x10*3/uL    Basophils Absolute 0.05 0.00 - 0.10 x10*3/uL   POCT GLUCOSE   Result Value Ref Range    POCT Glucose 129 (H) 74 - 99 mg/dL   Transthoracic Echo (TTE) Complete   Result Value Ref Range    BSA 1.95 m2   POCT GLUCOSE   Result Value Ref Range    POCT Glucose 110 (H) 74 - 99 mg/dL     US gallbladder    Result Date: 10/11/2023  Interpreted By:  Nolberto Rai, STUDY: US GALLBLADDER;  10/11/2023 3:00 am   INDICATION: Signs/Symptoms:Epigastric pain, gallbladder distended with sludge/stones on CT.   COMPARISON: None.   ACCESSION NUMBER(S): [id number]   ORDERING CLINICIAN: ORAL FITCH   TECHNIQUE: Multiple sonographic grayscale and color images of the right upper quadrant were performed.   FINDINGS: The liver demonstrates normal echogenicity. 1.3 cm cyst in the left  hepatic lobe. There is cholelithiasis and gallbladder sludge. There is distention of the gallbladder. No evidence of gallbladder wall thickening. Per the sonographer, sonographic Jeffrey sign is however positive. The common bile duct measures 7 mm in diameter, upper limits normal for patient's age.   There is obscuration of the pancreas secondary to shadowing from overlying bowel gas.   The right kidney measures 11.7 cm in length. No right hydronephrosis.       Gallbladder sludge and cholelithiasis. There is distention of the gallbladder. There is no evidence of gallbladder wall thickening or pericholecystic edema. Per the sonographer, sonographic Jeffrey sign is however positive and clinical correlation is recommended if there is concern for acute cholecystitis, and HIDA scan may be obtained for further evaluation.     MACRO: None   Signed by: Nolberto Rai 10/11/2023 3:30 AM Dictation workstation:   [id number]    CT chest abdomen pelvis wo IV contrast    Result Date: 10/11/2023  Interpreted By:  Nolberto Rai, STUDY: CT CHEST ABDOMEN PELVIS WO CONTRAST;  10/11/2023 12:19 am   INDICATION: Signs/Symptoms:chest pain, assess for fluid overload.   COMPARISON: 8/18/2022   ACCESSION NUMBER(S): [id number]   ORDERING CLINICIAN: DENVER MYERS   TECHNIQUE: Contiguous axial images of the chest, abdomen and pelvis were obtained without intravenous contrast. Coronal and sagittal reformatted images were obtained from the axial images.   FINDINGS: CT CHEST:   The examination is limited secondary to lack of intravenous contrast.   No axillary or mediastinal lymphadenopathy. There is limited evaluation for hilar lymphadenopathy on noncontrast examination. The heart is normal in size. Coronary artery atherosclerotic calcifications.   No airspace consolidation or pleural effusion. No pneumothorax.   Multilevel degenerative change with diffuse osseous bridging spurring of the thoracic spine.   CT ABDOMEN PELVIS:   Evaluation of the  abdominal viscera is limited secondary lack of intravenous contrast. Limited evaluation for liver mass on noncontrast examination. 1.4 cm fluid attenuation lesion in the left hepatic lobe may correspond to a cyst. There is layering density in the gallbladder compatible with sludge or stones.   The pancreas, spleen, and adrenal glands appear unremarkable.   Evaluation of the kidneys is limited secondary to lack of intravenous contrast. 1.8 cm cyst in the medial left kidney. There is also a stable indeterminate 10 mm lesion in the upper pole the left kidney. Bilateral renal vascular calcifications. No hydronephrosis.   Atherosclerotic calcification of the abdominal aorta and bilateral iliac arteries. Stable 2.6 cm infrarenal abdominal aortic aneurysm.   No evidence of bowel obstruction.   No significant free abdominal or pelvic fluid.   Urinary bladder is underdistended and not well evaluated.   There is redemonstration of enlargement and lobularity of the uterus with calcifications compatible with fibroids.   Multilevel degenerative change of the lumbar spine.       No airspace consolidation or pleural effusion.   No significant free abdominal or pelvic fluid.   Layering density in the gallbladder may correspond to sludge and/or stones.   Fibroid uterus.   MACRO: None   Signed by: Nolberto Rai 10/11/2023 1:40 AM Dictation workstation:   [id number]    XR chest 1 view    Result Date: 10/10/2023  STUDY: Chest Radiograph;  10/10/2023 4:12 PM INDICATION: Chest pain. COMPARISON: XR chest 08/18/2022. ACCESSION NUMBER(S): [id number] ORDERING CLINICIAN: BARAK PATRICIO TECHNIQUE:  Frontal chest was obtained at 1535 hours. FINDINGS: CARDIOMEDIASTINAL SILHOUETTE: Cardiomediastinal silhouette is normal in size and configuration.  LUNGS: Lungs are clear. There is no evidence of pleural effusion or pneumothorax.  ABDOMEN: No remarkable upper abdominal findings.  BONES: No acute osseous changes.    No acute cardiopulmonary  disease. Signed by Chuck Padron MD     Assessment/Plan     Right upper quadrant abdominal and epigastric pain, with CT evidence of cholelithiasis, possible etiologies include symptomatic gallstones, biliary colic, acute cholecystitis.  Elevated D-dimer, defer further work-up to primary service    -Await surgical consultation  -Supportive care with antiemetics and analgesia as needed  -Consider VQ scan  -Ultrasound of bilateral lower extremities to rule out VTE  -No plans for additional GI work-up at this point.      I spent >60 minutes in the professional and overall care of this patient.      Genesis Kohler, APRN-CNP

## 2023-10-11 NOTE — ANESTHESIA POSTPROCEDURE EVALUATION
Patient: Leanna Carter    Procedure Summary       Date: 10/11/23 Room / Location: [facility] OR 06 / [facility] OR    Anesthesia Start: 1649 Anesthesia Stop: 1750    Procedure: Cholecystectomy Laparoscopy Diagnosis:       Chest pain, unspecified type      (Chest pain, unspecified type [R07.9])    Surgeons: Usman Nathan MD Responsible Provider: Nolberto Joiner MD    Anesthesia Type: general ASA Status: 3            Anesthesia Type: general    Vitals Value Taken Time   /66 10/11/23 1754   Temp 37 °C (98.6 °F) 10/11/23 1754   Pulse 78 10/11/23 1754   Resp 16 10/11/23 1754   SpO2 98 % 10/11/23 1754       Anesthesia Post Evaluation    Patient location during evaluation: bedside  Patient participation: complete - patient participated  Level of consciousness: awake and alert  Pain management: satisfactory to patient  Airway patency: patent  Cardiovascular status: acceptable  Respiratory status: acceptable  Hydration status: acceptable        No notable events documented.

## 2023-10-11 NOTE — PROGRESS NOTES
10/11/23 1013   Discharge Planning   Living Arrangements Alone   Type of Residence Private residence   Number of Stairs Within Residence 0   Patient expects to be discharged to: Home   Does the patient need discharge transport arranged? Yes   RoundTrip coordination needed? Yes   Has discharge transport been arranged? No     Met with patient at bedside to discuss discharge planning needs.  Patient lives alone in an apartment.  Patient is planning to return home.  Patient still drives and feels she is pretty independent.  Patient has been getting lightheaded and SOB on exertion (vacuuming).  Patient does not currently use home O2.  Patient uses a rollator when she is out of the house and a walker/cane when she is in her apartment.  Patient is not sure if she will need transportation when she is discharged--I let her know that we can arrange a ride home if she does not have anyone available to pick her up from the hospital.  Patient also stated she has had 9 previous strokes and sometimes it is hard for her to remember things.  [employer] updated.

## 2023-10-11 NOTE — H&P
History Of Present Illness  Leanna Carter is a 78 y.o. female presenting with chest pain.  Patient reports that the pain initially started yesterday.  Last night around 7 PM was the first occurrence.  She was getting ready to go to her [employer] for service and the chest pain lasted throughout the service.  Her blood pressure was checked during that time and it was reported that her systolic BP was around 180.  She reports that lasted about 1 hour and then resolved.  However it reoccurred this morning.  She reports that she went to the podiatry and then went out to lunch at [facility] and after she sat down she started having the chest pain.  The chest pain was the same and best described as a sharp pain but at a higher intensity in comparison to yesterday.  She describes the pain as being midsternal.  She reports that since the chest pain started this afternoon and has been intermittent and does not recognize any triggering factors such as movement.  She also endorses worsening bilateral lower extremity edema recently.  She endorses shortness of breath and lightheadedness.  However the lightheadedness has been ongoing since before the chest pain started.  She denies any nausea, jaw pain, diaphoresis, however she does endorse some left-sided arm pain today.  She does have a past medical history of MGUS (follows with hematology at [facility]), anemia, diabetes, cirrhosis, CKD, CVA, HFpEF, Bell's palsy, and hypertension.  She denies ever having a heart attack previously.     Past Medical History  Past Medical History:   Diagnosis Date    Contusion of left knee, initial encounter 05/28/2021    Contusion of knee, left       Surgical History  Past Surgical History:   Procedure Laterality Date    CT HEAD ANGIO W AND WO IV CONTRAST  5/15/2018    CT HEAD ANGIO W AND WO IV CONTRAST 5/15/2018 [facility] ANCILLARY LEGACY    CT HEAD ANGIO W AND WO IV CONTRAST  6/6/2017    CT HEAD ANGIO W AND WO IV CONTRAST 6/6/2017 [facility] CLINICAL LEGACY    CT  NECK ANGIO W AND WO IV CONTRAST  5/15/2018    CT NECK ANGIO W AND WO IV CONTRAST 5/15/2018 [facility] ANCILLARY LEGACY    CT NECK ANGIO W AND WO IV CONTRAST  6/6/2017    CT NECK ANGIO W AND WO IV CONTRAST 6/6/2017 [facility] CLINICAL LEGACY    KNEE SURGERY  04/24/2014    Knee Surgery    MR HEAD ANGIO WO IV CONTRAST  4/12/2014    MR HEAD ANGIO WO IV CONTRAST 4/12/2014 [facility] CLINICAL LEGACY    MR HEAD ANGIO WO IV CONTRAST  6/18/2019    MR HEAD ANGIO WO IV CONTRAST 6/18/2019 [facility] CLINICAL LEGACY    MR HEAD ANGIO WO IV CONTRAST  8/16/2021    MR HEAD ANGIO WO IV CONTRAST 8/16/2021 [facility]CILLARY LEGACY    MR HEAD ANGIO WO IV CONTRAST  2/4/2017    MR HEAD ANGIO WO IV CONTRAST 2/4/2017 [facility] CLINICAL LEGACY    MR NECK ANGIO WO IV CONTRAST  4/12/2014    MR NECK ANGIO WO IV CONTRAST 4/12/2014 [facility] CLINICAL LEGACY    MR NECK ANGIO WO IV CONTRAST  6/18/2019    MR NECK ANGIO WO IV CONTRAST 6/18/2019 [facility] CLINICAL LEGACY    MR NECK ANGIO WO IV CONTRAST  8/16/2021    MR NECK ANGIO WO IV CONTRAST 8/16/2021 [facility]CILLARY LEGACY    MR NECK ANGIO WO IV CONTRAST  2/4/2017    MR NECK ANGIO WO IV CONTRAST 2/4/2017 [facility] CLINICAL LEGACY        Social History  She reports that she has never smoked. She has never used smokeless tobacco. She reports that she does not drink alcohol. No history on file for drug use.    Family History  Family History   Problem Relation Name Age of Onset    COPD Mother      Diabetes Mother      Hypertension Mother      Diabetes Sister      Hypertension Sister      Aneurysm Maternal Grandmother          Allergies  Oxycodone-acetaminophen and Phenytoin sodium extended    Review of Systems   Respiratory:  Positive for shortness of breath.    Cardiovascular:  Positive for chest pain and leg swelling.        Physical Exam  Constitutional:       Appearance: She is obese.   Cardiovascular:      Rate and Rhythm: Normal rate and regular rhythm.      Pulses: Normal pulses.      Heart sounds: Normal heart sounds. No murmur heard.     No  "gallop.   Pulmonary:      Effort: Pulmonary effort is normal. No respiratory distress.      Breath sounds: Normal breath sounds. No wheezing or rhonchi.      Comments: Diminished bilaterally  Abdominal:      General: Abdomen is flat. There is no distension.      Palpations: Abdomen is soft.      Tenderness: There is no abdominal tenderness. There is no guarding.   Musculoskeletal:      Right lower leg: Edema present.      Left lower leg: Edema present.   Skin:     General: Skin is warm.      Capillary Refill: Capillary refill takes less than 2 seconds.   Neurological:      Mental Status: She is alert and oriented to person, place, and time.   Psychiatric:         Mood and Affect: Mood normal.         Thought Content: Thought content normal.         Judgment: Judgment normal.          Last Recorded Vitals  Blood pressure 149/79, pulse 76, temperature 36.3 °C (97.4 °F), temperature source Temporal, resp. rate 19, height 1.651 m (5' 5\"), weight 83 kg (183 lb), SpO2 98 %.    Relevant Results    Scheduled medications  amLODIPine, 5 mg, oral, Daily  [START ON 10/11/2023] atenolol, 50 mg, oral, Daily  atorvastatin, 20 mg, oral, Daily  clopidogrel, 75 mg, oral, Daily  docusate sodium, 100 mg, oral, BID  furosemide, 40 mg, intravenous, BID  [Held by provider] furosemide, 40 mg, oral, BID  heparin (porcine), 5,000 Units, subcutaneous, q8h BRODY  [START ON 10/11/2023] insulin lispro, 0-5 Units, subcutaneous, TID with meals  [START ON 10/11/2023] pantoprazole, 40 mg, oral, Daily before breakfast  perflutren lipid microspheres, 3 mL of dilution, intravenous, Once in imaging  perflutren protein A microsphere, 0.5 mL, intravenous, Once in imaging  sulfur hexafluoride microsphr, 2 mL, intravenous, Once in imaging      Continuous medications     PRN medications  PRN medications: dextrose 10 % in water (D10W), dextrose, glucagon, ondansetron **OR** ondansetron, polyethylene glycol    Results for orders placed or performed during the " hospital encounter of 10/10/23 (from the past 24 hour(s))   CBC and Auto Differential   Result Value Ref Range    WBC 9.2 4.4 - 11.3 x10*3/uL    nRBC 0.0 0.0 - 0.0 /100 WBCs    RBC 4.13 4.00 - 5.20 x10*6/uL    Hemoglobin 11.7 (L) 12.0 - 16.0 g/dL    Hematocrit 35.1 (L) 36.0 - 46.0 %    MCV 85 80 - 100 fL    MCH 28.3 26.0 - 34.0 pg    MCHC 33.3 32.0 - 36.0 g/dL    RDW 14.1 11.5 - 14.5 %    Platelets 261 150 - 450 x10*3/uL    MPV 11.0 7.5 - 11.5 fL    Neutrophils % 59.7 40.0 - 80.0 %    Immature Granulocytes %, Automated 0.5 0.0 - 0.9 %    Lymphocytes % 29.6 13.0 - 44.0 %    Monocytes % 6.1 2.0 - 10.0 %    Eosinophils % 3.3 0.0 - 6.0 %    Basophils % 0.8 0.0 - 2.0 %    Neutrophils Absolute 5.52 (H) 1.60 - 5.50 x10*3/uL    Immature Granulocytes Absolute, Automated 0.05 0.00 - 0.50 x10*3/uL    Lymphocytes Absolute 2.73 0.80 - 3.00 x10*3/uL    Monocytes Absolute 0.56 0.05 - 0.80 x10*3/uL    Eosinophils Absolute 0.30 0.00 - 0.40 x10*3/uL    Basophils Absolute 0.07 0.00 - 0.10 x10*3/uL   Comprehensive Metabolic Panel   Result Value Ref Range    Glucose 166 (H) 74 - 99 mg/dL    Sodium 139 136 - 145 mmol/L    Potassium 3.4 (L) 3.5 - 5.3 mmol/L    Chloride 102 98 - 107 mmol/L    Bicarbonate 24 21 - 32 mmol/L    Anion Gap 16 10 - 20 mmol/L    Urea Nitrogen 27 (H) 6 - 23 mg/dL    Creatinine 2.37 (H) 0.50 - 1.05 mg/dL    eGFR 21 (L) >60 mL/min/1.73m*2    Calcium 9.3 8.6 - 10.3 mg/dL    Albumin 4.4 3.4 - 5.0 g/dL    Alkaline Phosphatase 136 33 - 136 U/L    Total Protein 8.2 6.4 - 8.2 g/dL    AST 15 9 - 39 U/L    Bilirubin, Total 0.5 0.0 - 1.2 mg/dL    ALT 11 7 - 45 U/L   Magnesium   Result Value Ref Range    Magnesium 1.50 (L) 1.60 - 2.40 mg/dL   B-type natriuretic peptide   Result Value Ref Range    BNP 56 0 - 99 pg/mL   Troponin I, High Sensitivity, Initial   Result Value Ref Range    Troponin I, High Sensitivity 7 0 - 13 ng/L   Troponin, High Sensitivity, 1 Hour   Result Value Ref Range    Troponin I, High Sensitivity 6 0 - 13  ng/L     XR chest 1 view    Result Date: 10/10/2023  STUDY: Chest Radiograph;  10/10/2023 4:12 PM INDICATION: Chest pain. COMPARISON: XR chest 08/18/2022. ACCESSION NUMBER(S): [id number] ORDERING CLINICIAN: BARAK PATRICIO TECHNIQUE:  Frontal chest was obtained at 1535 hours. FINDINGS: CARDIOMEDIASTINAL SILHOUETTE: Cardiomediastinal silhouette is normal in size and configuration.  LUNGS: Lungs are clear. There is no evidence of pleural effusion or pneumothorax.  ABDOMEN: No remarkable upper abdominal findings.  BONES: No acute osseous changes.    No acute cardiopulmonary disease. Signed by Chuck Padron MD            Assessment/Plan   Principal Problem:    Chest pain    Leanna Carter is a 78 y.o. female presenting with chest pain.  Patient reports that the pain initially started yesterday.  Last night around 7 PM was the first occurrence.  She was getting ready to go to her [employer] for service and the chest pain lasted throughout the service.  Her blood pressure was checked during that time and it was reported that her systolic BP was around 180.  She reports that lasted about 1 hour and then resolved.  However it reoccurred this morning.  She reports that she went to the podiatry and then went out to lunch at [facility] and after she sat down she started having the chest pain.  The chest pain was the same and best described as a sharp pain but at a higher intensity in comparison to yesterday.  She describes the pain as being midsternal.  She reports that since the chest pain started this afternoon and has been intermittent and does not recognize any triggering factors such as movement.  She also endorses worsening bilateral lower extremity edema recently.  She endorses shortness of breath and lightheadedness.  However the lightheadedness has been ongoing since before the chest pain started.  She denies any nausea, jaw pain, diaphoresis, however she does endorse some left-sided arm pain today.  She does have a past  medical history of MGUS (follows with hematology at [facility]), anemia, diabetes, cirrhosis , CKD, CVA, HFpEF, Bell's palsy, and hypertension.  She denies ever having a heart attack previously.    It is noted that patient is not aware of the medication she takes at home, she is able to recognize the atenolol and the Plavix but not others.  She does not have a med list on her at this time.  Med list was taken from recent visit located in epic.     Chest pain  -Atypical, describes as midsternal and stabbing  -Likely pleuritic, is reproducible on exam.  Worsening when pressing midsternally.  -BNP negative at 56  -Troponins negative x2, recheck in a.m.  -Chest x-ray negative  -Add D-dimer  -Echocardiogram, last echo noted to be in 2019  -Due to pain being reproducible and patient reports severity of pain, CT chest abdomen pelvis ordered.  She does have cirrhosis.  Due to body habitus unable to tell for possible ascites on exam.   -Consider cardiology consult, chest pain is reproducible at this time which is atypical  -Does have some bilateral lower extremity edema, will give few doses of IV Lasix.  Will need to monitor kidney function on Lasix  -Telemetry    Diabetes  -Does not remember how much insulin she is on at home or what insulin she is on at home  -Correctional sliding scale for now  -ACHS Accu-Cheks    CKD  -Creatinine 2.37  -At baseline, has been stable since November 2022  -Avoid nephrotoxic agents  -Recheck in a.m.    History of CVA  -Continue statin and Plavix    DVT prophylaxis:  Heparin, SCD    DC plan:  DC home when medically stable         I spent 45 minutes in the professional and overall care of this patient.      Reshma Montesinos, APRN-CNP

## 2023-10-11 NOTE — PROGRESS NOTES
Interval Hx:  -Rapid response called for CP  -similar to the CP which brought her to the ER  -currently rating 8/10  -stabbing pain which does not radiate, non-exertional   -associated SOB and lightheadedness  -endorses SOB with exertion over the last several weeks or so  -former smoker   -denies recent travel, or sick contacts  -denies prior history of DVT/PE      PHYSICAL EXAM:  GENERAL: Alert, NAD, cooperative  LUNGS:  No respiratory distress, no significant wheezing, rhonchi, or rales appreciated  CARDS: RRR, +TTP epigastric region and R chest wall  GI: Soft, non-distended, +TTP epigastric region as well as lateral aspect of RUQ  MS/Extremities: +b/l compression stockings in place, no pedal edema appreciated, symmetric in size, no calf tenderness appreciated  NEURO: A&Ox3, grossly nonfocal exam      Reviewed:  Most recent labs  Most recent imaging  Current medications  Vital Flow sheets  EKGs  Recent provider notes       A/P:  Chest pain  Anemia in chronic kidney disease  CKD (chronic kidney disease) stage 4, GFR 15-29 ml/min (CMS/HCC)  Essential hypertension  Mixed hyperlipidemia  NAFLD (nonalcoholic fatty liver disease)  Obstructive sleep apnea syndrome  Epigastric pain  Type 2 diabetes mellitus with stage 4 chronic kidney disease, with long-term current use of insulin (CMS/HCC)  Elevated ddimer   -reproducible on exam   -EKG: NSR.  Non-specific T wave abnormalities.  Qtc 473  -HS FIOR --> 6 --> 7 --> 6  -NPO except for meds   -CT chest/abd/pel --> suggestive gallbladder sludge/stones --> RUQ US ordered --> + cholelithasis with gallbladder sludge and stones, + sonographic contreras sign --> GI consult  -ddimer > 10K --> unable to get CTA chest to r/o PE due to renal function --> Wells score PE 0 (see score below) --> will hold off on additional imaging for now, would obtain VQ scan should she became hypoxic, tachycardic, or tachypneic       Total time spent [including but not limited to]: Obtaining and reviewing  patient medical records/history, obtaining a separate history,  examining and assessing the patient, providing  and education, placing pertinent orders for labs/tests/medications,  communicating with the patient/family/health team, documenting in the patient's EMR/formulation of this note, independently interpreting results and data, coordinating care:   40 minutes, with greater than 50% spent in personal discussion with patient and/or family    Thuy Caro PA-C    Pulmonary embolism Wells Score in Adults  0  Physical findings suggestive of DVT (unilateral leg swelling, calf/thigh tenderness)  (3 points)  0  No alternative diagnosis better explains illness (3 points)  0  Tachycardia > 100 (1.5 points)  0  Immobilization ( >/= 3 days) or surgery in previous 4 weeks (1.5 points)  0  Prior history of DVT/PE (1.5 points)  0  Presence of hemoptysis (1 point)  0  Presence of malignancy (1 point)    TOTAL score: 0    Risk score interpretation:    Score > 6: High probability  Score >/= 2 and </= 6: Moderate probability  Score < 2:  Low probability     Note: score < 2 and negative ddimer results in PE rate of 1.5%.

## 2023-10-11 NOTE — ANESTHESIA PREPROCEDURE EVALUATION
Patient: Leanna Carter    Procedure Information       Anesthesia Start Date/Time: 10/11/23 1612    Procedure: Cholecystectomy Laparoscopy    Location: [facility] / [facility]    Surgeons: Usman Nathan MD            Relevant Problems   Anesthesia   (+) Delayed emergence from anesthesia      Cardiovascular   (+) Chest pain   (+) Essential hypertension   (+) Mixed hyperlipidemia   (+) Occlusion and stenosis of bilateral carotid arteries      Endocrine   (+) Type 2 diabetes mellitus with stage 4 chronic kidney disease, with long-term current use of insulin (CMS/[facility])      /Renal   (+) CKD (chronic kidney disease) stage 4, GFR 15-29 ml/min (CMS/HCC)   (+) Chronic renal insufficiency (Cr 2.1)   (+) Liver cyst   (+) NAFLD (nonalcoholic fatty liver disease)      Neuro/Psych   (+) Bell's palsy   (+) CVA (cerebral vascular accident) (CMS/HCC)   (+) Cubital tunnel syndrome, left   (+) Mild carpal tunnel syndrome, left   (+) Occlusion and stenosis of bilateral carotid arteries   (+) Sciatica of right side      Pulmonary   (+) Obstructive sleep apnea syndrome (Now noncompliant w CPAP)      GI/Hepatic   (+) NAFLD (nonalcoholic fatty liver disease)      Hematology   (+) Anemia in chronic kidney disease      Musculoskeletal  Gout   (+) Degeneration of cervical intervertebral disc   (+) Degeneration of lumbosacral intervertebral disc   (+) Mild carpal tunnel syndrome, left   (+) Osteoarthritis of knee   (+) Pseudogout       Clinical information reviewed:   Tobacco  Allergies  Meds   Med Hx  Surg Hx   Fam Hx  Soc Hx        NPO Detail:  NPO/Void Status  Date of Last Liquid: 10/11/23  Time of Last Liquid: 0830  Date of Last Solid: 10/10/23  Time of Last Solid: 0000  Last Intake Type: Clear fluids         Physical Exam    Airway  Mallampati: II  Comments: Poor dentition, missing numerous teeth, none loose   Cardiovascular    Dental    Pulmonary    Abdominal          Anesthesia Plan    ASA 3     general      intravenous induction   Anesthetic plan and risks discussed with patient.

## 2023-10-12 VITALS
HEART RATE: 82 BPM | DIASTOLIC BLOOD PRESSURE: 65 MMHG | WEIGHT: 182.98 LBS | SYSTOLIC BLOOD PRESSURE: 131 MMHG | OXYGEN SATURATION: 96 % | TEMPERATURE: 98.3 F | HEIGHT: 65 IN | RESPIRATION RATE: 17 BRPM | BODY MASS INDEX: 30.49 KG/M2

## 2023-10-12 LAB
ALBUMIN SERPL BCP-MCNC: 3.8 G/DL (ref 3.4–5)
ALP SERPL-CCNC: 124 U/L (ref 33–136)
ALT SERPL W P-5'-P-CCNC: 23 U/L (ref 7–45)
ANION GAP SERPL CALC-SCNC: 14 MMOL/L (ref 10–20)
AST SERPL W P-5'-P-CCNC: 52 U/L (ref 9–39)
BASOPHILS # BLD AUTO: 0.05 X10*3/UL (ref 0–0.1)
BASOPHILS NFR BLD AUTO: 0.5 %
BILIRUB SERPL-MCNC: 0.4 MG/DL (ref 0–1.2)
BUN SERPL-MCNC: 21 MG/DL (ref 6–23)
CALCIUM SERPL-MCNC: 8.6 MG/DL (ref 8.6–10.3)
CHLORIDE SERPL-SCNC: 104 MMOL/L (ref 98–107)
CO2 SERPL-SCNC: 27 MMOL/L (ref 21–32)
CREAT SERPL-MCNC: 2.31 MG/DL (ref 0.5–1.05)
EOSINOPHIL # BLD AUTO: 0.12 X10*3/UL (ref 0–0.4)
EOSINOPHIL NFR BLD AUTO: 1.2 %
ERYTHROCYTE [DISTWIDTH] IN BLOOD BY AUTOMATED COUNT: 14.2 % (ref 11.5–14.5)
GFR SERPL CREATININE-BSD FRML MDRD: 21 ML/MIN/1.73M*2
GLUCOSE BLD MANUAL STRIP-MCNC: 139 MG/DL (ref 74–99)
GLUCOSE BLD MANUAL STRIP-MCNC: 147 MG/DL (ref 74–99)
GLUCOSE BLD MANUAL STRIP-MCNC: 178 MG/DL (ref 74–99)
GLUCOSE SERPL-MCNC: 133 MG/DL (ref 74–99)
HCT VFR BLD AUTO: 31.5 % (ref 36–46)
HGB BLD-MCNC: 10.3 G/DL (ref 12–16)
IMM GRANULOCYTES # BLD AUTO: 0.04 X10*3/UL (ref 0–0.5)
IMM GRANULOCYTES NFR BLD AUTO: 0.4 % (ref 0–0.9)
LYMPHOCYTES # BLD AUTO: 1.5 X10*3/UL (ref 0.8–3)
LYMPHOCYTES NFR BLD AUTO: 14.5 %
MCH RBC QN AUTO: 28 PG (ref 26–34)
MCHC RBC AUTO-ENTMCNC: 32.7 G/DL (ref 32–36)
MCV RBC AUTO: 86 FL (ref 80–100)
MONOCYTES # BLD AUTO: 0.71 X10*3/UL (ref 0.05–0.8)
MONOCYTES NFR BLD AUTO: 6.9 %
NEUTROPHILS # BLD AUTO: 7.92 X10*3/UL (ref 1.6–5.5)
NEUTROPHILS NFR BLD AUTO: 76.5 %
NRBC BLD-RTO: 0 /100 WBCS (ref 0–0)
PLATELET # BLD AUTO: 222 X10*3/UL (ref 150–450)
PMV BLD AUTO: 10.3 FL (ref 7.5–11.5)
POTASSIUM SERPL-SCNC: 4.4 MMOL/L (ref 3.5–5.3)
PROT SERPL-MCNC: 6.6 G/DL (ref 6.4–8.2)
RBC # BLD AUTO: 3.68 X10*6/UL (ref 4–5.2)
SODIUM SERPL-SCNC: 141 MMOL/L (ref 136–145)
WBC # BLD AUTO: 10.3 X10*3/UL (ref 4.4–11.3)

## 2023-10-12 PROCEDURE — 96361 HYDRATE IV INFUSION ADD-ON: CPT

## 2023-10-12 PROCEDURE — 99232 SBSQ HOSP IP/OBS MODERATE 35: CPT

## 2023-10-12 PROCEDURE — 2500000004 HC RX 250 GENERAL PHARMACY W/ HCPCS (ALT 636 FOR OP/ED): Performed by: SURGERY

## 2023-10-12 PROCEDURE — 96372 THER/PROPH/DIAG INJ SC/IM: CPT | Mod: 59 | Performed by: SURGERY

## 2023-10-12 PROCEDURE — 99232 SBSQ HOSP IP/OBS MODERATE 35: CPT | Performed by: NURSE PRACTITIONER

## 2023-10-12 PROCEDURE — 96365 THER/PROPH/DIAG IV INF INIT: CPT

## 2023-10-12 PROCEDURE — 99238 HOSP IP/OBS DSCHRG MGMT 30/<: CPT | Performed by: NURSE PRACTITIONER

## 2023-10-12 PROCEDURE — 85025 COMPLETE CBC W/AUTO DIFF WBC: CPT | Performed by: SURGERY

## 2023-10-12 PROCEDURE — 2500000001 HC RX 250 WO HCPCS SELF ADMINISTERED DRUGS (ALT 637 FOR MEDICARE OP): Performed by: SURGERY

## 2023-10-12 PROCEDURE — 82947 ASSAY GLUCOSE BLOOD QUANT: CPT

## 2023-10-12 PROCEDURE — 2580000001 HC RX 258 IV SOLUTIONS: Performed by: NURSE PRACTITIONER

## 2023-10-12 PROCEDURE — 36415 COLL VENOUS BLD VENIPUNCTURE: CPT | Performed by: SURGERY

## 2023-10-12 PROCEDURE — 80053 COMPREHEN METABOLIC PANEL: CPT | Performed by: SURGERY

## 2023-10-12 PROCEDURE — G0378 HOSPITAL OBSERVATION PER HR: HCPCS

## 2023-10-12 RX ORDER — OXYCODONE HYDROCHLORIDE 5 MG/1
5 TABLET ORAL EVERY 6 HOURS PRN
Qty: 12 TABLET | Refills: 0 | Status: SHIPPED | OUTPATIENT
Start: 2023-10-12 | End: 2024-02-29 | Stop reason: WASHOUT

## 2023-10-12 RX ORDER — POLYETHYLENE GLYCOL 3350 17 G/17G
17 POWDER, FOR SOLUTION ORAL DAILY PRN
Qty: 30 PACKET | Refills: 0 | Status: SHIPPED | OUTPATIENT
Start: 2023-10-12 | End: 2024-02-29 | Stop reason: WASHOUT

## 2023-10-12 RX ADMIN — SODIUM CHLORIDE, POTASSIUM CHLORIDE, SODIUM LACTATE AND CALCIUM CHLORIDE 1000 ML: 600; 310; 30; 20 INJECTION, SOLUTION INTRAVENOUS at 08:47

## 2023-10-12 RX ADMIN — ATORVASTATIN CALCIUM 80 MG: 80 TABLET, FILM COATED ORAL at 08:44

## 2023-10-12 RX ADMIN — HEPARIN SODIUM 5000 UNITS: 5000 INJECTION INTRAVENOUS; SUBCUTANEOUS at 06:22

## 2023-10-12 RX ADMIN — PANTOPRAZOLE SODIUM 40 MG: 40 TABLET, DELAYED RELEASE ORAL at 06:22

## 2023-10-12 RX ADMIN — CEFAZOLIN SODIUM 1 G: 1 INJECTION, SOLUTION INTRAVENOUS at 06:22

## 2023-10-12 RX ADMIN — AMLODIPINE BESYLATE 10 MG: 10 TABLET ORAL at 08:44

## 2023-10-12 RX ADMIN — CEFAZOLIN SODIUM 1 G: 1 INJECTION, SOLUTION INTRAVENOUS at 12:09

## 2023-10-12 RX ADMIN — CLOPIDOGREL BISULFATE 75 MG: 75 TABLET ORAL at 08:44

## 2023-10-12 RX ADMIN — DOCUSATE SODIUM 100 MG: 100 CAPSULE, LIQUID FILLED ORAL at 08:44

## 2023-10-12 NOTE — DISCHARGE INSTRUCTIONS
Instructions After Gallbladder Surgery    Wound Care:   -Ice packs to wounds every hour the first day  -Ok to shower in 24 hours  -no baths or swimming for 2 weeks  -keep wound clean and dry  -do not apply topical creams or ointments  -call if you notice redness around wound, foul-smelling drainage, or increasing pain     Diet:   -Avoid greasy, fatty foods first few weeks   -[place], soft meals first day or two after surgery    Activity   -Take it easy for the first 48 hours   -stairs and walks are fine   -resume activities gradually over the first week   -ask Dr Nathan before resuming strenuous physical exertions   -No driving while on pain medication    Medications   -Pain medicine prescription attached for severe pain   -You can also take Motrin/Ibuprofen 400mg every 6 hours for mild pain   -Resume your home medications unless otherwise directed   -To avoid constipation please take Colace 100mg twice a day (over the counter)    Other Instructions   -Call to make appointment within 1-2 days:  [phone]   -Call the doctor for the following:  severe unrelieved pain  fevers > 101F  Nausea/vomiting  wound issues  insurance/return to work forms  shortness of breath  chest pains   -Feedback on your surgical experience is appreciated!

## 2023-10-12 NOTE — PROGRESS NOTES
GI Daily Progress Note    Assessment/Plan:    Principal Problem:    Chest pain  Active Problems:    Anemia in chronic kidney disease    CKD (chronic kidney disease) stage 4, GFR 15-29 ml/min (CMS/[facility])    Type 2 diabetes mellitus with stage 4 chronic kidney disease, with long-term current use of insulin (CMS/[facility])    Essential hypertension    Mixed hyperlipidemia    NAFLD (nonalcoholic fatty liver disease)    Obstructive sleep apnea syndrome    Epigastric pain    Elevated d-dimer    Delayed emergence from anesthesia    CVA (cerebral vascular accident) (CMS/[facility])    Chronic renal insufficiency    Cholecystitis    Right upper quadrant abdominal and epigastric pain, with CT evidence of cholelithiasis, possible etiologies include symptomatic gallstones, biliary colic, acute cholecystitis. S/p lap choelcystectomy    -Diet per surgical team  -Supportive care with antiemetic and analgesia as needed  -Patient advised to ambulate as possible  No further work up per GI , will sign off, please call if any questions          LOS: 0 days     Leanna Carter is a 78 y.o. female who was admitted with Chest pain. She reports his symptoms are improving with treatment.  Tolerated soft diet without worsening in hip pain, no nausea vomiting.    Subjective:    Patient expresses postoperative pain  Patient denies nausea, vomiting, tolerated diet.    Objective:    Vital signs in last 24 hours:  Temp:  [36.4 °C (97.5 °F)-37.4 °C (99.3 °F)] 36.9 °C (98.4 °F)  Heart Rate:  [68-93] 77  Resp:  [14-18] 17  BP: (125-157)/(62-87) 131/64    Intake/Output last 3 shifts:  I/O last 3 completed shifts:  In: 700 (8.4 mL/kg) [IV Piggyback:700]  Out: - (0 mL/kg)   Weight: 83 kg   Intake/Output this shift:  No intake/output data recorded.    Physical Exam  Constitutional:       Appearance: Normal appearance.   HENT:      Head: Normocephalic and atraumatic.      Mouth/Throat:      Mouth: Mucous membranes are moist.      Pharynx: Oropharynx is clear.   Eyes:       Conjunctiva/sclera: Conjunctivae normal.   Cardiovascular:      Rate and Rhythm: Normal rate and regular rhythm.   Pulmonary:      Effort: Pulmonary effort is normal.   Abdominal:      General: Bowel sounds are decreased.      Palpations: Abdomen is soft.      Tenderness: There is abdominal tenderness in the right upper quadrant and epigastric area.   Musculoskeletal:         General: Normal range of motion.      Cervical back: Neck supple.   Skin:     General: Skin is warm and dry.   Neurological:      General: No focal deficit present.      Mental Status: She is alert and oriented to person, place, and time.   Psychiatric:         Mood and Affect: Mood normal.         Behavior: Behavior normal.     Scheduled medications  amLODIPine, 10 mg, oral, Daily  atorvastatin, 80 mg, oral, Daily  ceFAZolin, 1 g, intravenous, q6h  clopidogrel, 75 mg, oral, Daily  docusate sodium, 100 mg, oral, BID  [Held by provider] furosemide, 40 mg, oral, BID  heparin (porcine), 5,000 Units, subcutaneous, q8h BRODY  insulin lispro, 0-5 Units, subcutaneous, Before meals & nightly  pantoprazole, 40 mg, oral, Daily before breakfast  polyethylene glycol, 17 g, oral, Daily      Continuous medications     PRN medications  PRN medications: acetaminophen, benzocaine-menthol, dextrose 10 % in water (D10W), dextrose, glucagon, oxyCODONE, polyethylene glycol       Results for orders placed or performed during the hospital encounter of 10/10/23 (from the past 24 hour(s))   POCT GLUCOSE   Result Value Ref Range    POCT Glucose 110 (H) 74 - 99 mg/dL   Sars-CoV-2 PCR, Symptomatic   Result Value Ref Range    Coronavirus 2019, PCR Not Detected Not Detected   POCT GLUCOSE   Result Value Ref Range    POCT Glucose 106 (H) 74 - 99 mg/dL   POCT GLUCOSE   Result Value Ref Range    POCT Glucose 151 (H) 74 - 99 mg/dL   CBC and Auto Differential   Result Value Ref Range    WBC 10.3 4.4 - 11.3 x10*3/uL    nRBC 0.0 0.0 - 0.0 /100 WBCs    RBC 3.68 (L) 4.00 - 5.20  x10*6/uL    Hemoglobin 10.3 (L) 12.0 - 16.0 g/dL    Hematocrit 31.5 (L) 36.0 - 46.0 %    MCV 86 80 - 100 fL    MCH 28.0 26.0 - 34.0 pg    MCHC 32.7 32.0 - 36.0 g/dL    RDW 14.2 11.5 - 14.5 %    Platelets 222 150 - 450 x10*3/uL    MPV 10.3 7.5 - 11.5 fL    Neutrophils % 76.5 40.0 - 80.0 %    Immature Granulocytes %, Automated 0.4 0.0 - 0.9 %    Lymphocytes % 14.5 13.0 - 44.0 %    Monocytes % 6.9 2.0 - 10.0 %    Eosinophils % 1.2 0.0 - 6.0 %    Basophils % 0.5 0.0 - 2.0 %    Neutrophils Absolute 7.92 (H) 1.60 - 5.50 x10*3/uL    Immature Granulocytes Absolute, Automated 0.04 0.00 - 0.50 x10*3/uL    Lymphocytes Absolute 1.50 0.80 - 3.00 x10*3/uL    Monocytes Absolute 0.71 0.05 - 0.80 x10*3/uL    Eosinophils Absolute 0.12 0.00 - 0.40 x10*3/uL    Basophils Absolute 0.05 0.00 - 0.10 x10*3/uL   Comprehensive Metabolic Panel   Result Value Ref Range    Glucose 133 (H) 74 - 99 mg/dL    Sodium 141 136 - 145 mmol/L    Potassium 4.4 3.5 - 5.3 mmol/L    Chloride 104 98 - 107 mmol/L    Bicarbonate 27 21 - 32 mmol/L    Anion Gap 14 10 - 20 mmol/L    Urea Nitrogen 21 6 - 23 mg/dL    Creatinine 2.31 (H) 0.50 - 1.05 mg/dL    eGFR 21 (L) >60 mL/min/1.73m*2    Calcium 8.6 8.6 - 10.3 mg/dL    Albumin 3.8 3.4 - 5.0 g/dL    Alkaline Phosphatase 124 33 - 136 U/L    Total Protein 6.6 6.4 - 8.2 g/dL    AST 52 (H) 9 - 39 U/L    Bilirubin, Total 0.4 0.0 - 1.2 mg/dL    ALT 23 7 - 45 U/L   POCT GLUCOSE   Result Value Ref Range    POCT Glucose 139 (H) 74 - 99 mg/dL

## 2023-10-12 NOTE — DISCHARGE SUMMARY
Discharge Diagnosis  Chest pain    Issues Requiring Follow-Up  Ddimer 10,000 - fu with pcp. Ct pe neg. Us dvt neg.    Discharge Meds     Your medication list        START taking these medications        Instructions Last Dose Given Next Dose Due   oxyCODONE 5 mg immediate release tablet  Commonly known as: Roxicodone      Take 1 tablet (5 mg) by mouth every 6 hours if needed for severe pain (7 - 10).       polyethylene glycol 17 gram packet  Commonly known as: Glycolax, Miralax      Take 17 g by mouth once daily as needed (constipation).              CONTINUE taking these medications        Instructions Last Dose Given Next Dose Due   allopurinol 100 mg tablet  Commonly known as: Zyloprim           amLODIPine 10 mg tablet  Commonly known as: Norvasc           atorvastatin 80 mg tablet  Commonly known as: Lipitor           clobetasol 0.05 % ointment  Commonly known as: Temovate           clopidogrel 75 mg tablet  Commonly known as: Plavix           D3-5000 ORAL           furosemide 40 mg tablet  Commonly known as: Lasix           hydrOXYzine HCL 10 mg tablet  Commonly known as: Atarax           irbesartan 75 mg tablet  Commonly known as: Avapro           NovoLIN N FlexPen 100 unit/mL (3 mL) injection           spironolactone 50 mg tablet  Commonly known as: Aldactone           tiZANidine 2 mg tablet  Commonly known as: Zanaflex           VITAMIN B COMPLEX ORAL                     Where to Get Your Medications        These medications were sent to [id number] - [place], [place] - [address] [facility] [place]  [address] [place] [address]      Phone: [phone]   oxyCODONE 5 mg immediate release tablet  polyethylene glycol 17 gram packet         Test Results Pending At Discharge  Pending Labs       Order Current Status    Surgical Pathology Exam In process            Hospital Course  Principal Problem:    Chest pain  Active Problems:    Anemia in chronic kidney disease    CKD (chronic  kidney disease) stage 4, GFR 15-29 ml/min (CMS/[facility])    Type 2 diabetes mellitus with stage 4 chronic kidney disease, with long-term current use of insulin (CMS/[facility])    Essential hypertension    Mixed hyperlipidemia    NAFLD (nonalcoholic fatty liver disease)    Obstructive sleep apnea syndrome    Epigastric pain    Elevated d-dimer    Delayed emergence from anesthesia    CVA (cerebral vascular accident) (CMS/[facility])    Chronic renal insufficiency    Cholecystitis     Plan:  - Continue current pain regimen  - Continue on carb controlled diet   - PRN antiemetic   - Daily PPI   - Bowel Regimen: colace BID   - DVT proph: SCDs/ambulate/subcutaneous heparin  Lap zechariah yesterday, home today       Pertinent Physical Exam At Time of Discharge  Physical Exam  Constitutional:       Appearance: Normal appearance. She is obese.   HENT:      Mouth/Throat:      Mouth: Mucous membranes are moist.      Pharynx: Oropharynx is clear.   Eyes:      Pupils: Pupils are equal, round, and reactive to light.   Cardiovascular:      Rate and Rhythm: Normal rate and regular rhythm.      Pulses: Normal pulses.      Heart sounds: Normal heart sounds.   Pulmonary:      Effort: Pulmonary effort is normal.      Breath sounds: Normal breath sounds.   Abdominal:      General: Bowel sounds are normal. There is slight distension.      Palpations: Abdomen is soft.      Tenderness: There is abdominal tenderness.      Comments: Laps sites c/d/I no erythema or drainage.    Musculoskeletal:         General: Normal range of motion.   Skin:     General: Skin is warm and dry.   Neurological:      General: No focal deficit present.      Mental Status: She is alert and oriented to person, place, and time.   Psychiatric:         Mood and Affect: Mood normal.         Behavior: Behavior normal.        Outpatient Follow-Up  Future Appointments   Date Time Provider Department Center   10/26/2023 10:00 AM MARIPOSA Rocha-CNP [id number] [facility]   11/7/2023  1:30 PM  Martinez Cedeño, APRN-CNP [id number] [facility]   11/9/2023 10:45 AM Chalino Herndon DO [id number] [facility]   12/18/2023 11:30 AM [facility]  [facility]       Maria G Cabrales, APRN-CNP   epidural

## 2023-10-12 NOTE — PROGRESS NOTES
Patient had lap zechariah yesterday.  Possible discharge today if labs are WNL, pain is managed and patient is able to tolerate PO intake.  Patient plans to return home upon discharge.  Patient may require transportation home.  Will continue to follow for discharge planning needs.                    Soila Hoover RN

## 2023-10-12 NOTE — PROGRESS NOTES
"Leanna Carter is a 78 y.o. female on day 0 of admission presenting with Chest pain.    Subjective   Patient is awake in bed this morning. She reports doing well. Pain controlled and tolerating diet without nausea and vomiting.       Objective     Physical Exam  Constitutional:       Appearance: Normal appearance.   Cardiovascular:      Rate and Rhythm: Normal rate and regular rhythm.   Pulmonary:      Effort: Pulmonary effort is normal.      Comments: Non labored breathing on O2 NC  Abdominal:      General: There is distension.      Palpations: Abdomen is soft.      Tenderness: There is no abdominal tenderness.      Comments: Lap sites C/D/I, edges well approximated, covered in Dermabond.   Skin:     General: Skin is warm and dry.   Neurological:      General: No focal deficit present.      Mental Status: She is alert and oriented to person, place, and time. Mental status is at baseline.   Psychiatric:         Attention and Perception: Attention normal.         Mood and Affect: Mood normal.         Speech: Speech normal.         Behavior: Behavior normal.         Cognition and Memory: Cognition normal.         Last Recorded Vitals  Blood pressure 140/78, pulse 75, temperature 36.7 °C (98.1 °F), temperature source Temporal, resp. rate 17, height 1.65 m (5' 4.96\"), weight 83 kg (182 lb 15.7 oz), SpO2 95 %.  Intake/Output last 3 Shifts:  I/O last 3 completed shifts:  In: 700 (8.4 mL/kg) [IV Piggyback:700]  Out: - (0 mL/kg)   Weight: 83 kg     Relevant Results           This patient currently has cardiac telemetry ordered; if you would like to modify or discontinue the telemetry order, click here to go to the orders activity to modify/discontinue the order.                 Assessment/Plan   Principal Problem:    Chest pain  Active Problems:    Anemia in chronic kidney disease    CKD (chronic kidney disease) stage 4, GFR 15-29 ml/min (CMS/[facility])    Type 2 diabetes mellitus with stage 4 chronic kidney disease, with " long-term current use of insulin (CMS/HCC)    Essential hypertension    Mixed hyperlipidemia    NAFLD (nonalcoholic fatty liver disease)    Obstructive sleep apnea syndrome    Epigastric pain    Elevated d-dimer    Cholecystitis    Delayed emergence from anesthesia    CVA (cerebral vascular accident) (CMS/HCC)    Chronic renal insufficiency     Plan: POD #1 Lap Cholecystectomy, doing well  - Progress diet as tolerated   - Encourage IS  - Encourage OOB as much as tolerated  - Continue with current pain control regimen  - Continue current bowel regimen  - Follow up outpatient with Dr. Biggs in 10-14 days    Dispo: Patient is doing well and can be discharged when cleared by medicine team.        I spent 35 minutes in the professional and overall care of this patient.      MATT Montelongo

## 2023-10-12 NOTE — PROGRESS NOTES
"Leanna Carter is a 78 y.o. female on day 0 of admission presenting with Chest pain.    Subjective   Patient is POD 0 Laparoscopic Cholecystectomy. Patient is progressing well. She endorse mild discomfort and belching but so significant pain.  She denies any additional flatus or BM. She endorses PO intake without any complications.  She denies any nausea, vomiting, fever, chills, SOB or CP.       Objective     Physical Exam  Constitutional:       Appearance: Normal appearance. She is obese.   HENT:      Mouth/Throat:      Mouth: Mucous membranes are moist.      Pharynx: Oropharynx is clear.   Eyes:      Pupils: Pupils are equal, round, and reactive to light.   Cardiovascular:      Rate and Rhythm: Normal rate and regular rhythm.      Pulses: Normal pulses.      Heart sounds: Normal heart sounds.   Pulmonary:      Effort: Pulmonary effort is normal.      Breath sounds: Normal breath sounds.   Abdominal:      General: Bowel sounds are normal. There is distension.      Palpations: Abdomen is soft.      Tenderness: There is abdominal tenderness.      Comments: Laps sites c/d/I no erythema or drainage.    Musculoskeletal:         General: Normal range of motion.   Skin:     General: Skin is warm and dry.   Neurological:      General: No focal deficit present.      Mental Status: She is alert and oriented to person, place, and time.   Psychiatric:         Mood and Affect: Mood normal.         Behavior: Behavior normal.         Last Recorded Vitals  Blood pressure 129/66, pulse 93, temperature 36.6 °C (97.8 °F), resp. rate 17, height 1.65 m (5' 4.96\"), weight 83 kg (182 lb 15.7 oz), SpO2 94 %.  Intake/Output last 3 Shifts:  I/O last 3 completed shifts:  In: 700 (8.4 mL/kg) [IV Piggyback:700]  Out: - (0 mL/kg)   Weight: 83 kg     Relevant Results  .Scheduled medications  amLODIPine, 10 mg, oral, Daily  atorvastatin, 80 mg, oral, Daily  ceFAZolin, 1 g, intravenous, q6h  clopidogrel, 75 mg, oral, Daily  docusate sodium, 100 " mg, oral, BID  furosemide, 40 mg, intravenous, BID  [Held by provider] furosemide, 40 mg, oral, BID  heparin (porcine), 5,000 Units, subcutaneous, q8h BRODY  insulin lispro, 0-5 Units, subcutaneous, Before meals & nightly  pantoprazole, 40 mg, oral, Daily before breakfast  polyethylene glycol, 17 g, oral, Daily      Continuous medications     PRN medications  PRN medications: acetaminophen, benzocaine-menthol, dextrose 10 % in water (D10W), dextrose, glucagon, oxyCODONE, polyethylene glycol    .  Results for orders placed or performed during the hospital encounter of 10/10/23 (from the past 24 hour(s))   Basic metabolic panel   Result Value Ref Range    Glucose 124 (H) 74 - 99 mg/dL    Sodium 142 136 - 145 mmol/L    Potassium 3.6 3.5 - 5.3 mmol/L    Chloride 106 98 - 107 mmol/L    Bicarbonate 26 21 - 32 mmol/L    Anion Gap 14 10 - 20 mmol/L    Urea Nitrogen 23 6 - 23 mg/dL    Creatinine 2.16 (H) 0.50 - 1.05 mg/dL    eGFR 23 (L) >60 mL/min/1.73m*2    Calcium 9.0 8.6 - 10.3 mg/dL   CBC and Auto Differential   Result Value Ref Range    WBC 7.6 4.4 - 11.3 x10*3/uL    nRBC 0.0 0.0 - 0.0 /100 WBCs    RBC 3.73 (L) 4.00 - 5.20 x10*6/uL    Hemoglobin 10.5 (L) 12.0 - 16.0 g/dL    Hematocrit 31.9 (L) 36.0 - 46.0 %    MCV 86 80 - 100 fL    MCH 28.2 26.0 - 34.0 pg    MCHC 32.9 32.0 - 36.0 g/dL    RDW 14.1 11.5 - 14.5 %    Platelets 220 150 - 450 x10*3/uL    MPV 10.5 7.5 - 11.5 fL    Neutrophils % 56.4 40.0 - 80.0 %    Immature Granulocytes %, Automated 0.4 0.0 - 0.9 %    Lymphocytes % 31.8 13.0 - 44.0 %    Monocytes % 6.4 2.0 - 10.0 %    Eosinophils % 4.3 0.0 - 6.0 %    Basophils % 0.7 0.0 - 2.0 %    Neutrophils Absolute 4.29 1.60 - 5.50 x10*3/uL    Immature Granulocytes Absolute, Automated 0.03 0.00 - 0.50 x10*3/uL    Lymphocytes Absolute 2.42 0.80 - 3.00 x10*3/uL    Monocytes Absolute 0.49 0.05 - 0.80 x10*3/uL    Eosinophils Absolute 0.33 0.00 - 0.40 x10*3/uL    Basophils Absolute 0.05 0.00 - 0.10 x10*3/uL   POCT GLUCOSE    Result Value Ref Range    POCT Glucose 129 (H) 74 - 99 mg/dL   Transthoracic Echo (TTE) Complete   Result Value Ref Range    LV A4C EF 59.7    POCT GLUCOSE   Result Value Ref Range    POCT Glucose 110 (H) 74 - 99 mg/dL   Sars-CoV-2 PCR, Symptomatic   Result Value Ref Range    Coronavirus 2019, PCR Not Detected Not Detected   POCT GLUCOSE   Result Value Ref Range    POCT Glucose 106 (H) 74 - 99 mg/dL   POCT GLUCOSE   Result Value Ref Range    POCT Glucose 151 (H) 74 - 99 mg/dL       .Transthoracic Echo (TTE) Complete    Result Date: 10/11/2023   [facility], [address] [place] [address]              Tel [phone] and Fax [phone] TRANSTHORACIC ECHOCARDIOGRAM REPORT  Patient Name:      SO CHAPMAN      Reading Physician:    [id number] Charles Duran DO Study Date:        10/11/2023           Ordering Provider:    [id number] MYERS MRN/PID:           [id number]             Fellow: Accession#:        [id number]         Nurse: Date of Birth/Age: 1945 / 78 years Sonographer:          Patty Ross RDCS Gender:            F                    Additional Staff: Height:            165.10 cm            Admit Date:           10/10/2023 Weight:            83.01 kg             Admission Status:     Inpatient -                                                               Priority discharge BSA:               1.90 m2              Encounter#:           [id number]                                         Department Location:  [facility] Non                                                               Invasive Blood Pressure: 134 /85 mmHg Study Type:    TRANSTHORACIC ECHO (TTE) COMPLETE Diagnosis/ICD: Chest pain, unspecified-R07.9 Indication:    chest pain CPT Code:      Echo Complete w Full [id number] Patient History: Diabetes:          Yes Pertinent History: HTN and  Chest Pain. OSAS, CKD. Study Detail: The following Echo studies were performed: 2D, M-Mode, Doppler and               color flow. Technically challenging study due to poor acoustic               windows, body habitus and prominent lung artifact. Agitated saline               used as a contrast agent for intraseptal flow evaluation.  PHYSICIAN INTERPRETATION: Left Ventricle: The left ventricular systolic function is hyperdynamic, with an estimated ejection fraction of 70-75%. There are no regional wall motion abnormalities. The left ventricular cavity size is normal. Spectral Doppler shows an abnormal pattern of left ventricular diastolic filling. Left Atrium: The left atrium is normal in size. There is no evidence of a patent foramen ovale. A bubble study using agitated saline was performed. Bubble study is negative. Right Ventricle: The right ventricle is normal in size. There is normal right ventricular global systolic function. Right Atrium: The right atrium is normal in size. Aortic Valve: The aortic valve appears structurally normal. There is no evidence of aortic valve regurgitation. The peak instantaneous gradient of the aortic valve is 6.5 mmHg. The mean gradient of the aortic valve is 3.0 mmHg. Mitral Valve: The mitral valve is normal in structure. There is no evidence of mitral valve regurgitation. Tricuspid Valve: The tricuspid valve is structurally normal. No evidence of tricuspid regurgitation. Pulmonic Valve: The pulmonic valve is structurally normal. There is no indication of pulmonic valve regurgitation. Pericardium: There is no pericardial effusion noted. Aorta: The aortic root is normal. In comparison to the previous echocardiogram(s): Compared with study from 2/3/2017, no significant change.  CONCLUSIONS:  1. Left ventricular systolic function is hyperdynamic with a 70-75% estimated ejection fraction.  2. Spectral Doppler shows an abnormal pattern of left ventricular diastolic filling.  3. There  is no evidence of a patent foramen ovale. QUANTITATIVE DATA SUMMARY: 2D MEASUREMENTS:                          Normal Ranges: LAs:           4.30 cm   (2.7-4.0cm) IVSd:          1.30 cm   (0.6-1.1cm) LVPWd:         1.30 cm   (0.6-1.1cm) LVIDd:         3.70 cm   (3.9-5.9cm) LVIDs:         2.50 cm LV Mass Index: 87.4 g/m2 LV % FS        32.4 % LA VOLUME:                               Normal Ranges: LA Vol A4C:        44.5 ml    (22+/-6mL/m2) LA Vol A2C:        58.6 ml LA Vol BP:         51.2 ml LA Vol Index A4C:  23.3ml/m2 LA Vol Index A2C:  30.8 ml/m2 LA Vol Index BP:   26.9 ml/m2 LA Area A4C:       17.1 cm2 LA Area A2C:       19.7 cm2 LA Major Axis A4C: 5.6 cm LA Major Axis A2C: 5.6 cm LA Volume Index:   26.8 ml/m2 RA VOLUME BY A/L METHOD:                       Normal Ranges: RA Area A4C: 14.4 cm2 M-MODE MEASUREMENTS:                  Normal Ranges: Ao Root: 3.00 cm (2.0-3.7cm) LAs:     4.00 cm (2.7-4.0cm) AORTA MEASUREMENTS:                      Normal Ranges: Ao Sinus, d: 3.00 cm (2.1-3.5cm) Ao STJ, d:   2.30 cm (1.7-3.4cm) Asc Ao, d:   2.76 cm (2.1-3.4cm) LV SYSTOLIC FUNCTION BY 2D PLANIMETRY (MOD):                     Normal Ranges: EF-A4C View: 59.7 % (>=55%) EF-A2C View: 76.1 % EF-Biplane:  66.9 % LV DIASTOLIC FUNCTION:                               Normal Ranges: MV Peak E:        0.93 m/s    (0.7-1.2 m/s) MV Peak A:        1.21 m/s    (0.42-0.7 m/s) E/A Ratio:        0.77        (1.0-2.2) MV lateral e'     0.05 m/s MV medial e'      0.06 m/s MV A Dur:         148.00 msec E/e' Ratio:       17.60       (<8.0) PulmV Sys Shaun:    53.80 cm/s PulmV Bazan Shaun:   40.00 cm/s PulmV S/D Shaun:    1.30 PulmV A Revs Shaun: 31.10 cm/s PulmV A Revs Dur: 161.00 msec MITRAL VALVE:                 Normal Ranges: MV DT: 235 msec (150-240msec) AORTIC VALVE:                                   Normal Ranges: AoV Vmax:                1.27 m/s (<=1.7m/s) AoV Peak P.5 mmHg (<20mmHg) AoV Mean PG:             3.0 mmHg  (1.7-11.5mmHg) LVOT Max Shaun:            0.90 m/s (<=1.1m/s) AoV VTI:                 29.10 cm (18-25cm) LVOT VTI:                18.20 cm LVOT Diameter:           2.00 cm  (1.8-2.4cm) AoV Area, VTI:           1.96 cm2 (2.5-5.5cm2) AoV Area,Vmax:           2.23 cm2 (2.5-4.5cm2) AoV Dimensionless Index: 0.63  RIGHT VENTRICLE: RV Basal 3.08 cm RV Mid   1.82 cm RV Major 6.7 cm TRICUSPID VALVE/RVSP:                             Normal Ranges: Peak TR Velocity: 1.96 m/s Est. RA Pressure: 3 mmHg RV Syst Pressure: 18.4 mmHg (< 30mmHg) IVC Diam:         1.58 cm PULMONIC VALVE:                         Normal Ranges: PV Accel Time: 42 msec  (>120ms) PV Max Shaun:    0.9 m/s  (0.6-0.9m/s) PV Max PG:     3.5 mmHg Pulmonary Veins: PulmV A Revs Dur: 161.00 msec PulmV A Revs Shaun: 31.10 cm/s PulmV Bazan Shaun:   40.00 cm/s PulmV S/D Shaun:    1.30 PulmV Sys Shaun:    53.80 cm/s  [id number] Charles Duran DO Electronically signed on 10/11/2023 at 3:04:44 PM  ** Final **     Vascular US lower extremity venous duplex bilateral    Result Date: 10/11/2023  Interpreted By:  Jose Boateng, STUDY: [facility] US LOWER EXTREMITY VENOUS DUPLEX BILATERAL;  10/11/2023 2:29 pm   INDICATION: Signs/Symptoms:ddimer 10K.   COMPARISON: None.   ACCESSION NUMBER(S): [id number]   ORDERING CLINICIAN: SCOTT VALVERDE   TECHNIQUE: Vascular ultrasound of the bilateral lower extremities was performed. Realtime compression views as well as Gray scale, color Doppler and spectral Doppler waveform analysis was performed.   FINDINGS: Evaluation of the visualized portions of the bilateral common femoral vein, proximal, mid, and distal femoral vein, and popliteal vein were performed.  Evaluation of the visualized portions of the  posterior tibial and peroneal veins were also performed.   Limitations:  None   The evaluated veins demonstrate normal compressibility. There is intact venous flow demonstrating normal respiratory variability and normal augmentation of flow with calf compression.  Therefore, there is no ultrasonographic evidence for deep vein thrombosis within the evaluated veins.       No sonographic evidence for deep vein thrombosis within the evaluated veins of the bilateral lower extremities.   Signed by: Jose Boateng 10/11/2023 2:44 PM Dictation workstation:   [id number]    NM lung perfusion particulate    Result Date: 10/11/2023  Interpreted By:  Mo Koroma and Osman Sena STUDY: NM LUNG PERFUSION PARTICULATE;  10/11/2023 1:52 pm   INDICATION: Signs/Symptoms:ddimer 10,000, chest pain.   COMPARISON: CT chest and abdomen on 10/10/2023   ACCESSION NUMBER(S): [id number]   ORDERING CLINICIAN: SCOTT VALVERDE   TECHNIQUE: DIVISION OF NUCLEAR MEDICINE PERFUSION LUNG SCANS   Multiple perfusion images of the lungs were acquired after the intravenous administration of 4.2 mCi of Tc-99m macroaggregated albumin (MAA). In addition,SPECT/CT of the chest was performed.   FINDINGS: Perfusion images of both lungs demonstrate mild heterogeneity throughout the lung fields bilaterally. There are no distinct segmental perfusion defects seen.       The perfusion of both lungs is within normal limits with no evidence for acute pulmonary embolism.   The interpretation above is based on modified PIOPED II and PISAPED criteria.   This study was analyzed and interpreted at [place], [place].   Signed by: Mo Koroma 10/11/2023 2:02 PM Dictation workstation:   [id number]    US gallbladder    Result Date: 10/11/2023  Interpreted By:  Nolberto Rai, STUDY: US GALLBLADDER;  10/11/2023 3:00 am   INDICATION: Signs/Symptoms:Epigastric pain, gallbladder distended with sludge/stones on CT.   COMPARISON: None.   ACCESSION NUMBER(S): [id number]   ORDERING CLINICIAN: ORAL FITCH   TECHNIQUE: Multiple sonographic grayscale and color images of the right upper quadrant were performed.   FINDINGS: The liver demonstrates normal echogenicity. 1.3 cm cyst in the left hepatic lobe. There is  cholelithiasis and gallbladder sludge. There is distention of the gallbladder. No evidence of gallbladder wall thickening. Per the sonographer, sonographic Jeffrey sign is however positive. The common bile duct measures 7 mm in diameter, upper limits normal for patient's age.   There is obscuration of the pancreas secondary to shadowing from overlying bowel gas.   The right kidney measures 11.7 cm in length. No right hydronephrosis.       Gallbladder sludge and cholelithiasis. There is distention of the gallbladder. There is no evidence of gallbladder wall thickening or pericholecystic edema. Per the sonographer, sonographic Jeffrey sign is however positive and clinical correlation is recommended if there is concern for acute cholecystitis, and HIDA scan may be obtained for further evaluation.     MACRO: None   Signed by: Nolberto Rai 10/11/2023 3:30 AM Dictation workstation:   [id number]    CT chest abdomen pelvis wo IV contrast    Result Date: 10/11/2023  Interpreted By:  Nolberto Rai, STUDY: CT CHEST ABDOMEN PELVIS WO CONTRAST;  10/11/2023 12:19 am   INDICATION: Signs/Symptoms:chest pain, assess for fluid overload.   COMPARISON: 8/18/2022   ACCESSION NUMBER(S): [id number]   ORDERING CLINICIAN: DENVER MYERS   TECHNIQUE: Contiguous axial images of the chest, abdomen and pelvis were obtained without intravenous contrast. Coronal and sagittal reformatted images were obtained from the axial images.   FINDINGS: CT CHEST:   The examination is limited secondary to lack of intravenous contrast.   No axillary or mediastinal lymphadenopathy. There is limited evaluation for hilar lymphadenopathy on noncontrast examination. The heart is normal in size. Coronary artery atherosclerotic calcifications.   No airspace consolidation or pleural effusion. No pneumothorax.   Multilevel degenerative change with diffuse osseous bridging spurring of the thoracic spine.   CT ABDOMEN PELVIS:   Evaluation of the abdominal viscera is  limited secondary lack of intravenous contrast. Limited evaluation for liver mass on noncontrast examination. 1.4 cm fluid attenuation lesion in the left hepatic lobe may correspond to a cyst. There is layering density in the gallbladder compatible with sludge or stones.   The pancreas, spleen, and adrenal glands appear unremarkable.   Evaluation of the kidneys is limited secondary to lack of intravenous contrast. 1.8 cm cyst in the medial left kidney. There is also a stable indeterminate 10 mm lesion in the upper pole the left kidney. Bilateral renal vascular calcifications. No hydronephrosis.   Atherosclerotic calcification of the abdominal aorta and bilateral iliac arteries. Stable 2.6 cm infrarenal abdominal aortic aneurysm.   No evidence of bowel obstruction.   No significant free abdominal or pelvic fluid.   Urinary bladder is underdistended and not well evaluated.   There is redemonstration of enlargement and lobularity of the uterus with calcifications compatible with fibroids.   Multilevel degenerative change of the lumbar spine.       No airspace consolidation or pleural effusion.   No significant free abdominal or pelvic fluid.   Layering density in the gallbladder may correspond to sludge and/or stones.   Fibroid uterus.   MACRO: None   Signed by: Nolberto Rai 10/11/2023 1:40 AM Dictation workstation:   [id number]    XR chest 1 view    Result Date: 10/10/2023  STUDY: Chest Radiograph;  10/10/2023 4:12 PM INDICATION: Chest pain. COMPARISON: XR chest 08/18/2022. ACCESSION NUMBER(S): [id number] ORDERING CLINICIAN: BARAK PATRICIO TECHNIQUE:  Frontal chest was obtained at 1535 hours. FINDINGS: CARDIOMEDIASTINAL SILHOUETTE: Cardiomediastinal silhouette is normal in size and configuration.  LUNGS: Lungs are clear. There is no evidence of pleural effusion or pneumothorax.  ABDOMEN: No remarkable upper abdominal findings.  BONES: No acute osseous changes.    No acute cardiopulmonary disease. Signed by Chuck  MD Nereida         Assessment/Plan   Principal Problem:    Chest pain  Active Problems:    Anemia in chronic kidney disease    CKD (chronic kidney disease) stage 4, GFR 15-29 ml/min (CMS/[facility])    Type 2 diabetes mellitus with stage 4 chronic kidney disease, with long-term current use of insulin (CMS/[facility])    Essential hypertension    Mixed hyperlipidemia    NAFLD (nonalcoholic fatty liver disease)    Obstructive sleep apnea syndrome    Epigastric pain    Elevated d-dimer    Delayed emergence from anesthesia    CVA (cerebral vascular accident) (CMS/[facility])    Chronic renal insufficiency    Cholecystitis    Plan:  - Continue current pain regimen  - Continue on carb controlled diet   - PRN antiemetic   - Daily PPI   - Bowel Regimen: colace BID   - DVT proph: SCDs/ambulate/subcutaneous heparin    Dispo: Patient progressing well, anticipate discharge when medically cleared.        I spent 35 minutes in the professional and overall care of this patient.      Ana Palma, APRN-CNP

## 2023-10-12 NOTE — CARE PLAN
The patient's goals for the shift include      Problem: Pain - Adult  Goal: Verbalizes/displays adequate comfort level or baseline comfort level  Flowsheets (Taken 10/12/2023 0745)  Verbalizes/displays adequate comfort level or baseline comfort level:   Encourage patient to monitor pain and request assistance   Assess pain using appropriate pain scale     Problem: Safety - Adult  Goal: Free from fall injury  Flowsheets (Taken 10/12/2023 0745)  Free from fall injury: Instruct family/caregiver on patient safety          The clinical goals for the shift include n/a    Problem: Diabetes  Goal: Maintain glucose levels >70mg/dl to <250mg/dl throughout shift  Flowsheets (Taken 10/12/2023 0745)  Maintain glucose levels >70mg/dl to <250mg/dl throughout shift: Med administration/monitoring of effect

## 2023-10-17 LAB
LABORATORY COMMENT REPORT: NORMAL
PATH REPORT.FINAL DX SPEC: NORMAL
PATH REPORT.GROSS SPEC: NORMAL
PATH REPORT.RELEVANT HX SPEC: NORMAL
PATH REPORT.TOTAL CANCER: NORMAL

## 2023-10-20 ENCOUNTER — TELEPHONE (OUTPATIENT)
Dept: POSTOP/PACU | Facility: HOSPITAL | Age: 78
End: 2023-10-20

## 2023-10-24 ENCOUNTER — OFFICE VISIT (OUTPATIENT)
Dept: SURGERY | Facility: CLINIC | Age: 78
End: 2023-10-24
Payer: MEDICARE

## 2023-10-24 DIAGNOSIS — Z09 SURGERY FOLLOW-UP: Primary | ICD-10-CM

## 2023-10-24 PROCEDURE — 1159F MED LIST DOCD IN RCRD: CPT | Performed by: SURGERY

## 2023-10-24 PROCEDURE — 1036F TOBACCO NON-USER: CPT | Performed by: SURGERY

## 2023-10-24 PROCEDURE — 1125F AMNT PAIN NOTED PAIN PRSNT: CPT | Performed by: SURGERY

## 2023-10-24 PROCEDURE — 99024 POSTOP FOLLOW-UP VISIT: CPT | Performed by: SURGERY

## 2023-10-24 ASSESSMENT — PAIN SCALES - GENERAL: PAINLEVEL: 2

## 2023-10-24 NOTE — PROGRESS NOTES
Leanna Carter is a 78 y.o. female on day 0 of admission presenting with No Principal Problem: There is no principal problem currently on the Problem List. Please update the Problem List and refresh..    Assessment/Plan   Excellent recovery following recent laparoscopic cholecystectomy  -We reviewed intraoperative findings and final pathology report  -Patient encouraged to resume regular activities including exercise as tolerated  -Avoid greasy and fatty foods first couple months after surgery  -Follow-up with me as needed    Subjective   Status post laparoscopic cholecystectomy for acute cholecystitis.  Doing well.  Minimal pain.       Objective     Physical Exam  NAD  A&Ox3  Non icteric  CTA  RR  Abdomen soft min tender. Wounds clean, intact  Extremities warm, well perfused     Last Recorded Vitals  There were no vitals taken for this visit.  Intake/Output last 3 Shifts:  No intake/output data recorded.    Relevant Results    Scheduled medications    Continuous medications    PRN medications      No results found for this or any previous visit (from the past 24 hour(s)).        I spent 25 minutes in the professional and overall care of this patient.      Usman Nathan MD

## 2023-10-26 ENCOUNTER — OFFICE VISIT (OUTPATIENT)
Dept: NEPHROLOGY | Facility: CLINIC | Age: 78
End: 2023-10-26
Payer: MEDICARE

## 2023-10-26 VITALS
BODY MASS INDEX: 31.16 KG/M2 | WEIGHT: 187 LBS | HEIGHT: 65 IN | DIASTOLIC BLOOD PRESSURE: 63 MMHG | SYSTOLIC BLOOD PRESSURE: 129 MMHG | HEART RATE: 73 BPM

## 2023-10-26 DIAGNOSIS — D63.1 ANEMIA DUE TO STAGE 4 CHRONIC KIDNEY DISEASE (MULTI): ICD-10-CM

## 2023-10-26 DIAGNOSIS — N18.4 CKD (CHRONIC KIDNEY DISEASE) STAGE 4, GFR 15-29 ML/MIN (MULTI): Primary | Chronic | ICD-10-CM

## 2023-10-26 DIAGNOSIS — N18.4 ANEMIA DUE TO STAGE 4 CHRONIC KIDNEY DISEASE (MULTI): ICD-10-CM

## 2023-10-26 DIAGNOSIS — I10 ESSENTIAL HYPERTENSION: Chronic | ICD-10-CM

## 2023-10-26 PROCEDURE — 1126F AMNT PAIN NOTED NONE PRSNT: CPT | Performed by: NURSE PRACTITIONER

## 2023-10-26 PROCEDURE — 3074F SYST BP LT 130 MM HG: CPT | Performed by: NURSE PRACTITIONER

## 2023-10-26 PROCEDURE — 1159F MED LIST DOCD IN RCRD: CPT | Performed by: NURSE PRACTITIONER

## 2023-10-26 PROCEDURE — 1036F TOBACCO NON-USER: CPT | Performed by: NURSE PRACTITIONER

## 2023-10-26 PROCEDURE — 99214 OFFICE O/P EST MOD 30 MIN: CPT | Performed by: NURSE PRACTITIONER

## 2023-10-26 PROCEDURE — 3078F DIAST BP <80 MM HG: CPT | Performed by: NURSE PRACTITIONER

## 2023-10-26 ASSESSMENT — ENCOUNTER SYMPTOMS
ACTIVITY CHANGE: 1
APPETITE CHANGE: 0
ABDOMINAL DISTENTION: 0
DIZZINESS: 0
LIGHT-HEADEDNESS: 0
PSYCHIATRIC NEGATIVE: 1
DYSURIA: 0
VOMITING: 0
NAUSEA: 0
ABDOMINAL PAIN: 0

## 2023-10-26 ASSESSMENT — PAIN SCALES - GENERAL: PAINLEVEL: 0-NO PAIN

## 2023-10-26 NOTE — PROGRESS NOTES
Subjective   Patient ID: Leanna Carter is a 78 y.o. female who presents for Follow-up for CKD stage 4.  HPI  Pt with CKD stage 4 in setting of DM with moderate albuminuria, HTN and MGUS (follows with Dr Quan) for follow up. Last seen in July. Labs in June with Cr 2.39 and eGFR 20ml/min. Most recent Cr 2.31 with eGFR 52ml/min on 10/12. Admitted earlier this month with chest pain, cardiac work up  negative. Imaging and symptoms concerning for symptomatic gallstones.  Had lap cholecystectomy on 10/11. Recovering well from surgery. Had follow up with surgeon on Tuesday with good report. Has some residual discomfort at lap entrance site. Healing well per surgeon .  No new c/o. Energy is improving gradually.     Review of Systems   Constitutional:  Positive for activity change. Negative for appetite change.   Respiratory:  Negative for shortness of breath.    Cardiovascular:  Positive for leg swelling. Negative for chest pain.        Has had significant LE but has started wearing compression socks for last few days and has 100% improvement.   Gastrointestinal:  Negative for abdominal distention, abdominal pain, nausea and vomiting.   Genitourinary:  Negative for dysuria.   Musculoskeletal:         Has hip pain leading to numbness in foot.  Will discuss with her primary dr. Forgot to mention it to her yesterday   Skin:  Positive for rash.        Rash consistent with psoriasis per pt.   Neurological:  Negative for dizziness and light-headedness.   Psychiatric/Behavioral: Negative.         Objective   Physical Exam  Constitutional:       Appearance: Normal appearance.   HENT:      Mouth/Throat:      Mouth: Mucous membranes are moist.   Cardiovascular:      Rate and Rhythm: Normal rate and regular rhythm.      Heart sounds: Normal heart sounds.   Pulmonary:      Effort: Pulmonary effort is normal.      Breath sounds: Normal breath sounds.   Abdominal:      Palpations: Abdomen is soft.   Musculoskeletal:      Cervical  "back: Normal range of motion.      Right lower leg: No edema.      Left lower leg: No edema.      Comments: Has support socks on   Skin:     Findings: Rash present.      Comments: psoriasis   Neurological:      General: No focal deficit present.      Mental Status: She is alert and oriented to person, place, and time.   Psychiatric:         Mood and Affect: Mood normal.         Behavior: Behavior normal.   /63   Pulse 73   Ht 1.651 m (5' 5\")   Wt 84.8 kg (187 lb)   BMI 31.12 kg/m²   Lab Results   Component Value Date    CREATININE 2.31 (H) 10/12/2023    BUN 21 10/12/2023     10/12/2023    K 4.4 10/12/2023     10/12/2023    CO2 27 10/12/2023      Assessment/Plan      CKD stage 4 in setting of poorly controlled DM and HTN +albuminuria and MGUS. Kidney function with some fluctuation though results the same one year ago. No obvious uremic symptoms, some generalized fatigue that is improving since recent gallbladder surgery.  Last labs on day of discharge with Cr 2.31 and eGFR 20ml/min. Pt to return in 3mths.      HTN: BP well controlled today. Reinforced low sodium diet and avoidance of convenience foods as able.   Note that pt is on irbesartan that was not previously listed.    consider increase of dose and decrease amlodipine.    Anemia: Hgb 10.3 on day of discharge. (11.7 on day of admission) likely d/t blood draws during hospital admission, OR procedure and hemodilution. No indication for ESAs at this time.          "

## 2023-11-06 ASSESSMENT — ENCOUNTER SYMPTOMS: SHORTNESS OF BREATH: 0

## 2023-11-07 ENCOUNTER — APPOINTMENT (OUTPATIENT)
Dept: HEMATOLOGY/ONCOLOGY | Facility: HOSPITAL | Age: 78
End: 2023-11-07
Payer: MEDICARE

## 2023-11-09 ENCOUNTER — APPOINTMENT (OUTPATIENT)
Dept: PRIMARY CARE | Facility: CLINIC | Age: 78
End: 2023-11-09
Payer: MEDICAID

## 2023-11-30 ENCOUNTER — LAB (OUTPATIENT)
Dept: LAB | Facility: LAB | Age: 78
End: 2023-11-30
Payer: MEDICAID

## 2023-12-15 DIAGNOSIS — N18.4 CKD (CHRONIC KIDNEY DISEASE) STAGE 4, GFR 15-29 ML/MIN (MULTI): Chronic | ICD-10-CM

## 2023-12-15 DIAGNOSIS — I10 ESSENTIAL HYPERTENSION: Primary | Chronic | ICD-10-CM

## 2023-12-19 RX ORDER — IRBESARTAN 75 MG/1
75 TABLET ORAL DAILY
Qty: 30 TABLET | Refills: 3 | Status: SHIPPED | OUTPATIENT
Start: 2023-12-19

## 2024-02-29 ENCOUNTER — OFFICE VISIT (OUTPATIENT)
Dept: NEPHROLOGY | Facility: CLINIC | Age: 79
End: 2024-02-29
Payer: MEDICARE

## 2024-02-29 ENCOUNTER — LAB (OUTPATIENT)
Dept: LAB | Facility: LAB | Age: 79
End: 2024-02-29
Payer: MEDICARE

## 2024-02-29 VITALS
BODY MASS INDEX: 29.49 KG/M2 | WEIGHT: 177 LBS | DIASTOLIC BLOOD PRESSURE: 62 MMHG | HEIGHT: 65 IN | SYSTOLIC BLOOD PRESSURE: 129 MMHG | HEART RATE: 68 BPM

## 2024-02-29 DIAGNOSIS — I10 ESSENTIAL HYPERTENSION: Chronic | ICD-10-CM

## 2024-02-29 DIAGNOSIS — N18.4 ANEMIA IN STAGE 4 CHRONIC KIDNEY DISEASE (MULTI): Chronic | ICD-10-CM

## 2024-02-29 DIAGNOSIS — D47.2 MGUS (MONOCLONAL GAMMOPATHY OF UNKNOWN SIGNIFICANCE): Chronic | ICD-10-CM

## 2024-02-29 DIAGNOSIS — N18.4 CKD (CHRONIC KIDNEY DISEASE) STAGE 4, GFR 15-29 ML/MIN (MULTI): Chronic | ICD-10-CM

## 2024-02-29 DIAGNOSIS — N18.4 CKD (CHRONIC KIDNEY DISEASE) STAGE 4, GFR 15-29 ML/MIN (MULTI): Primary | Chronic | ICD-10-CM

## 2024-02-29 DIAGNOSIS — D63.1 ANEMIA IN STAGE 4 CHRONIC KIDNEY DISEASE (MULTI): Chronic | ICD-10-CM

## 2024-02-29 LAB
APPEARANCE UR: CLEAR
BILIRUB UR STRIP.AUTO-MCNC: NEGATIVE MG/DL
COLOR UR: COLORLESS
CREAT UR-MCNC: 76.1 MG/DL (ref 20–320)
CREAT UR-MCNC: 76.1 MG/DL (ref 20–320)
GLUCOSE UR STRIP.AUTO-MCNC: NORMAL MG/DL
KETONES UR STRIP.AUTO-MCNC: NEGATIVE MG/DL
LEUKOCYTE ESTERASE UR QL STRIP.AUTO: NEGATIVE
MICROALBUMIN UR-MCNC: 23.5 MG/L
MICROALBUMIN/CREAT UR: 30.9 UG/MG CREAT
NITRITE UR QL STRIP.AUTO: NEGATIVE
PH UR STRIP.AUTO: 6 [PH]
PROT UR STRIP.AUTO-MCNC: NEGATIVE MG/DL
PROT UR-ACNC: 13 MG/DL (ref 5–24)
PROT/CREAT UR: 0.17 MG/MG CREAT (ref 0–0.17)
RBC # UR STRIP.AUTO: NEGATIVE /UL
SP GR UR STRIP.AUTO: 1.01
UROBILINOGEN UR STRIP.AUTO-MCNC: NORMAL MG/DL

## 2024-02-29 PROCEDURE — 3078F DIAST BP <80 MM HG: CPT | Performed by: NURSE PRACTITIONER

## 2024-02-29 PROCEDURE — 82570 ASSAY OF URINE CREATININE: CPT

## 2024-02-29 PROCEDURE — 3074F SYST BP LT 130 MM HG: CPT | Performed by: NURSE PRACTITIONER

## 2024-02-29 PROCEDURE — 1159F MED LIST DOCD IN RCRD: CPT | Performed by: NURSE PRACTITIONER

## 2024-02-29 PROCEDURE — 1126F AMNT PAIN NOTED NONE PRSNT: CPT | Performed by: NURSE PRACTITIONER

## 2024-02-29 PROCEDURE — 99214 OFFICE O/P EST MOD 30 MIN: CPT | Performed by: NURSE PRACTITIONER

## 2024-02-29 PROCEDURE — 1036F TOBACCO NON-USER: CPT | Performed by: NURSE PRACTITIONER

## 2024-02-29 PROCEDURE — 84156 ASSAY OF PROTEIN URINE: CPT

## 2024-02-29 PROCEDURE — 81003 URINALYSIS AUTO W/O SCOPE: CPT

## 2024-02-29 PROCEDURE — 82043 UR ALBUMIN QUANTITATIVE: CPT

## 2024-02-29 ASSESSMENT — ENCOUNTER SYMPTOMS
DIZZINESS: 0
VOICE CHANGE: 1
ACTIVITY CHANGE: 0
SHORTNESS OF BREATH: 0
DYSURIA: 0
APPETITE CHANGE: 0
LIGHT-HEADEDNESS: 0

## 2024-02-29 ASSESSMENT — PAIN SCALES - GENERAL: PAINLEVEL: 0-NO PAIN

## 2024-02-29 NOTE — PATIENT INSTRUCTIONS
It was nice to see you Ms Carter.  Blood pressure is well controlled.  Please stop at lab for urine tests.   Will call you after results and discussion with attending.  I will talk to PCP and Dr Schneider when we have discussed any plans.   Please avoid salty foods including potato chips!  Please return in one month but we will talk after results.     You have chronic kidney disease stage 4.  Avoid over the counter pain medications like Ibuprofen, naproxen, meloxicam, diclofenac, sulindac etc (NSAIDs).Tylenol is OK to use.Avoid IV contrast dye.Low sodium diet. BP goal less than 130/80 mm Hg.Tell all your health care providers you have chronic kidney disease stage 4.Check urine today

## 2024-02-29 NOTE — PROGRESS NOTES
Subjective   Patient ID: Leanna Carter is a 79 y.o. female who presents for Follow-up.  HPI  Pt for CKD follow up   [facility] DM (last A1c 8.9 8/2022 8.9), HTN, MGUS (IgG lambda)  Last seen in Oct. With stable kidney function over previous year with cr 2.3 and eGFR 21ml/min  Last urine moderate + for albuminuria; Neg pro on UA  Recent labs done at OSH  with increase in Cr from 2.3 to 2.9/2.8 with eGFR 16ml/min  Seen on 2/20 by Dr Schneider for MGUS follow up and her PCP today  Recently experiencing increase in itching that has been present but worsened. Hydrxyzine was increased. Now with new tremor to hands for ~month per pt. At side time feels her voice is tremulous. Dr Colby her PCP (seen today) has ordered cholestyramine 4mg bid for itching. Will decrease dose of atarax from 50 to 25 tid today and will discontinue when starts cholestyramine.   Furosemide increased recently (80mg in am and 40mg in pm; previously on 80mg daily))by PCP for increasing LE edema and increasing wt. She has noticed decrease in swelling     Review of Systems   Constitutional:  Negative for activity change and appetite change.        Appetite  not great but has been gradually had decrease in appetite.  Appetite fluctuates.   2 meals per day.   HENT:  Positive for voice change.    Respiratory:  Negative for shortness of breath.    Cardiovascular:  Positive for leg swelling. Negative for chest pain.        +WALKER which has improved with inc in lasix  2 pillows at night for comfort  No PND    Genitourinary:  Negative for dysuria.   Skin:         +itching   +rash from psoriasis    Neurological:  Negative for dizziness and light-headedness.       Objective   Physical Exam  Cardiovascular:      Rate and Rhythm: Normal rate and regular rhythm.      Heart sounds: Normal heart sounds.   Pulmonary:      Effort: Pulmonary effort is normal.      Breath sounds: Normal breath sounds.   Abdominal:      Palpations: Abdomen is soft.   Musculoskeletal:      Right  "lower leg: Edema present.      Left lower leg: Edema present.      Comments: Trace ankle edema bilat    Skin:     General: Skin is warm.   Neurological:      General: No focal deficit present.      Mental Status: She is oriented to person, place, and time.   Psychiatric:         Mood and Affect: Mood normal.         Behavior: Behavior normal.     /62   Pulse 68   Ht 1.651 m (5' 5\")   Wt 80.3 kg (177 lb)   BMI 29.45 kg/m²   Lab Results   Component Value Date    CREATININE 2.31 (H) 10/12/2023    BUN 21 10/12/2023     10/12/2023    K 4.4 10/12/2023     10/12/2023    CO2 27 10/12/2023   GFR 21ml/min  10/12     Assessment/Plan   Diagnoses and all orders for this visit:  CKD (chronic kidney disease) stage 4, GFR 15-29 ml/min (CMS/[facility])  Kidney function with worsening on recent labs last week though has had furosemide recently increased from 80mg daily to bid and may reflect change in Cr  No obvious uremic symptoms; most c/o center around itching and what may be side effects of hydroxyzine  Recent labs available, will add urine pro and albumin  Pt to return in one month  Essential hypertension  BP well controlled today  Trace ankle edema, wt down 10lbs since Oct  No changes today  Anemia in stage 4 chronic kidney disease (CMS/[facility])  Anemic though not appropriate for ESAs at this time  MGUS (monoclonal gammopathy of unknown significance)  Followed closely by Dr Quan  Recent increase in M protein to 0.7 from 0.3, question of Bmbx vs renal bx per notes   Has planned follow  up in 3mths     ADD: urine protein and albumin are present but minimal.  Reviewed with Dr Aivla as well. No indication for renal biopsy.  Spoke with pt today by phone.  (4/2/2024)    MARIPOSA Rocha-CNP 02/29/24 11:55 AM   "

## 2024-03-28 ENCOUNTER — APPOINTMENT (OUTPATIENT)
Dept: NEPHROLOGY | Facility: CLINIC | Age: 79
End: 2024-03-28
Payer: MEDICARE

## 2024-04-25 ENCOUNTER — OFFICE VISIT (OUTPATIENT)
Dept: ORTHOPEDIC SURGERY | Facility: CLINIC | Age: 79
End: 2024-04-25
Payer: MEDICARE

## 2024-04-25 ENCOUNTER — HOSPITAL ENCOUNTER (OUTPATIENT)
Dept: RADIOLOGY | Facility: CLINIC | Age: 79
Discharge: HOME | End: 2024-04-25
Payer: MEDICARE

## 2024-04-25 VITALS — BODY MASS INDEX: 28.49 KG/M2 | WEIGHT: 171 LBS | HEIGHT: 65 IN

## 2024-04-25 DIAGNOSIS — S99.911A RIGHT ANKLE INJURY: ICD-10-CM

## 2024-04-25 DIAGNOSIS — S82.51XA AVULSION FRACTURE OF MEDIAL MALLEOLUS OF RIGHT TIBIA, CLOSED, INITIAL ENCOUNTER: ICD-10-CM

## 2024-04-25 PROCEDURE — 1159F MED LIST DOCD IN RCRD: CPT | Performed by: FAMILY MEDICINE

## 2024-04-25 PROCEDURE — 99214 OFFICE O/P EST MOD 30 MIN: CPT | Performed by: FAMILY MEDICINE

## 2024-04-25 PROCEDURE — L4350 ANKLE CONTROL ORTHO PRE OTS: HCPCS | Performed by: FAMILY MEDICINE

## 2024-04-25 PROCEDURE — 73610 X-RAY EXAM OF ANKLE: CPT | Mod: RT

## 2024-04-25 PROCEDURE — 1036F TOBACCO NON-USER: CPT | Performed by: FAMILY MEDICINE

## 2024-04-25 PROCEDURE — 73610 X-RAY EXAM OF ANKLE: CPT | Mod: RIGHT SIDE | Performed by: RADIOLOGY

## 2024-04-25 NOTE — PROGRESS NOTES
History of Present Illness   Chief Complaint   Patient presents with    Right Ankle - Pain     DOI; 4/21/24 TWISTED ANKLE GETTING DOWN FROM BED.  SAW PCP 4/23/24 XRAYS DONE THEN SHOWED A RIGHT DISPLACED MEDIAL MALLEOLAR FRACTURE       The patient is 79 y.o. female  here with a complaint of right ankle pain.  Acute onset of symptoms 4 days ago, was getting down from her bed when she twisted her ankle with acute onset of pain, she did see her primary care physician following injury, she had x-rays obtained at outside facility, these are not available for review.  Patient says that she was told that she had a displaced medial malleolus fracture, her primary care physician recommended she follow-up at [facility] walk[facility].  She says that her pain is minimal with walking/weightbearing, she does use a walker/rollator at baseline to assist in ambulation for balance, she has been pain is present it is over the medial aspect of her ankle, she says there is some mild swelling but no bruising.  She has been taking Tylenol for pain control.  No numbness or tingling.    Past Medical History:   Diagnosis Date    Contusion of left knee, initial encounter 05/28/2021    Contusion of knee, left       Medication Documentation Review Audit       Reviewed by Jayce Liu MA (Medical Assistant) on 04/25/24 at 1413      Medication Order Taking? Sig Documenting Provider Last Dose Status   amLODIPine (Norvasc) 10 mg tablet [id number] No Take 1 tablet (10 mg) by mouth once daily. Historical Provider, MD Taking Active   atorvastatin (Lipitor) 80 mg tablet [id number]  Take 1 tablet (80 mg) by mouth once daily. Historical Provider, MD  Active   cholecalciferol, vitamin D3, (D3-5000 ORAL) [id number] No Take 1 tablet by mouth 1 (one) time each day. Chewable, for vitamin D deficiency Historical Provider, MD Taking Active   clobetasol (Temovate) 0.05 % ointment [id number] No APPLY SPARINGLY TO AFFECTED AREA(S) TWICE DAILY Historical  Provider, MD Taking Active   clopidogrel (Plavix) 75 mg tablet [id number]  Take 1 tablet (75 mg) by mouth once daily. Historical Provider, MD  Active   furosemide (Lasix) 40 mg tablet [id number]  Take 2 tablets (80 mg) by mouth once daily. Historical Provider, MD  Active   hydrOXYzine HCL (Atarax) 10 mg tablet [id number] No Take 1 tablet (10 mg) by mouth 3 times a day as needed for itching. Historical Provider, MD Taking Active   insulin NPH human isophane (NovoLIN N Flexpen) 100 unit/mL (3 mL) insulin pen [id number] No Inject 25 Units under the skin 2 times a day before meals. Historical Provider, MD Taking Active   irbesartan (Avapro) 75 mg tablet [id number] No TAKE 1 TABLET BY MOUTH EVERY DAY IMANI Rocha Taking Active   tiZANidine (Zanaflex) 2 mg capsule [id number] No Take 1 tablet (2 mg) by mouth as needed at bedtime for muscle spasms. Historical Provider, MD Taking Active   VITAMIN B COMPLEX ORAL [id number] No Take 1 tablet by mouth 1 (one) time each day. Historical Provider, MD Taking Active                    Allergies   Allergen Reactions    Oxycodone Confusion    Phenytoin Unknown    Phenytoin Sodium Extended Unknown    Oxycodone-Acetaminophen Confusion, Rash and Unknown       Social History     Socioeconomic History    Marital status: Single     Spouse name: Not on file    Number of children: Not on file    Years of education: Not on file    Highest education level: Not on file   Occupational History    Not on file   Tobacco Use    Smoking status: Former     Types: Cigarettes     Passive exposure: Past    Smokeless tobacco: Never   Vaping Use    Vaping status: Never Used   Substance and Sexual Activity    Alcohol use: Never    Drug use: Never    Sexual activity: Never   Other Topics Concern    Not on file   Social History Narrative    Not on file     Social Determinants of Health     Financial Resource Strain: Patient Declined (10/11/2023)    Overall Financial Resource Strain (CARDIA)     Difficulty of Paying  Living Expenses: Patient declined   Food Insecurity: Unknown (6/13/2023)    Received from [facility]    Hunger Vital Sign     Worried About Running Out of Food in the Last Year: Never true     Ran Out of Food in the Last Year: Not on file   Transportation Needs: Patient Declined (10/11/2023)    PRAPARE - Transportation     Lack of Transportation (Medical): Patient declined     Lack of Transportation (Non-Medical): Patient declined   Physical Activity: Not on File (3/4/2021)    Received from [facility] [employer] [facility]     Physical Activity     Physical Activity: 0   Stress: Not on File (3/4/2021)    Received from [facility]     Stress     Stress: 0   Social Connections: Not on File (3/4/2021)    Received from [facility]     Social Connections     Social Connections and Isolation: 0   Intimate Partner Violence: Not on file   Housing Stability: Unknown (10/11/2023)    Housing Stability Vital Sign     Unable to Pay for Housing in the Last Year: Patient refused     Number of Places Lived in the Last Year: Not on file     Unstable Housing in the Last Year: Patient refused       Past Surgical History:   Procedure Laterality Date    CT ANGIO NECK  5/15/2018    CT NECK ANGIO W AND WO IV CONTRAST 5/15/2018 [facility]    CT ANGIO NECK  6/6/2017    CT NECK ANGIO W AND WO IV CONTRAST 6/6/2017 [facility]    CT HEAD ANGIO W AND WO IV CONTRAST  5/15/2018    CT HEAD ANGIO W AND WO IV CONTRAST 5/15/2018 [facility]    CT HEAD ANGIO W AND WO IV CONTRAST  6/6/2017    CT HEAD ANGIO W AND WO IV CONTRAST 6/6/2017 [facility]GAC[facility]    KNEE SURGERY  04/24/2014    Knee Surgery    MR HEAD ANGIO WO IV CONTRAST  4/12/2014    MR HEAD ANGIO WO IV CONTRAST 4/12/2014 [facility]    MR HEAD ANGIO WO IV CONTRAST  6/18/2019    MR HEAD ANGIO WO IV CONTRAST 6/18/2019 [facility]GAC[facility]    MR HEAD ANGIO WO IV CONTRAST  8/16/2021    MR HEAD ANGIO WO IV CONTRAST 8/16/2021 [facility]    MR HEAD ANGIO WO IV CONTRAST  2/4/2017     MR HEAD ANGIO WO IV CONTRAST 2/4/2017 [facility]GAC[facility]    MR NECK ANGIO WO IV CONTRAST  4/12/2014    MR NECK ANGIO WO IV CONTRAST 4/12/2014 [facility]    MR NECK ANGIO WO IV CONTRAST  6/18/2019    MR NECK ANGIO WO IV CONTRAST 6/18/2019 [facility]    MR NECK ANGIO WO IV CONTRAST  8/16/2021    MR NECK ANGIO WO IV CONTRAST 8/16/2021 [facility]    MR NECK ANGIO WO IV CONTRAST  2/4/2017    MR NECK ANGIO WO IV CONTRAST 2/4/2017 [facility] CLINICAL LEGACY          Review of Systems   GENERAL: Negative  GI: Negative  MUSCULOSKELETAL: See HPI  SKIN: Negative  NEURO:  Negative     Physical Exam:    General/Constitutional: well appearing, no distress, appears stated age  HEENT: sclera clear  Respiratory: non labored breathing  Vascular: No edema, swelling or tenderness, except as noted in detailed exam.  Integumentary: No impressive skin lesions present, except as noted in detailed exam.  Neurological:  Alert and oriented   Psychological:  Normal mood and affect.  Musculoskeletal: Normal, except as noted in detailed exam and in HPI.  Mildly antalgic gait with rollator    Right ankle: There is some mild medial swelling, there is no ecchymosis.  She is tender to palpation at the tip of the medial malleolus, no other areas of tenderness to palpation.  Range of motion is relatively well-preserved, equal to the left in all directions, there is no significant motor deficits or pain with strength testing.  There is no gross ankle instability, sensation intact to light touch, 2+ pedal pulses       Imaging: X-rays of right ankle obtained today and independently reviewed, there is a small, minimally displaced tip avulsion fracture of the medial malleolus, ankle mortise is intact, there is a small plantar heel spur      Assessment   1. Avulsion fracture of medial malleolus of right tibia, closed, initial encounter  Ankle Brace, Lace Up or A60            Plan: Discussed diagnosis, reviewed x-rays, treatment  with patient.  Typically treat based on symptoms discomfort, patient is having minimal pain at this time, no indication for any boot immobilization, discussed potential increased risk of falls with this.  She was fitted for a lace up ankle brace for comfort that she can wear for the next few weeks, encouraged early active range of motion.  She will follow-up if symptoms are ongoing despite conservative management over the next several weeks, she can use Tylenol for pain control.

## 2025-01-13 ENCOUNTER — HOSPITAL ENCOUNTER (OUTPATIENT)
Dept: RADIOLOGY | Facility: HOSPITAL | Age: 80
Discharge: HOME | End: 2025-01-13
Payer: COMMERCIAL

## 2025-01-13 DIAGNOSIS — I65.21 OCCLUSION AND STENOSIS OF RIGHT CAROTID ARTERY: ICD-10-CM

## 2025-01-13 LAB
CREAT SERPL-MCNC: 1.43 MG/DL (ref 0.6–1.3)
GFR SERPL CREATININE-BSD FRML MDRD: 37 ML/MIN/1.73M*2

## 2025-01-13 PROCEDURE — 70496 CT ANGIOGRAPHY HEAD: CPT | Performed by: RADIOLOGY

## 2025-01-13 PROCEDURE — 70498 CT ANGIOGRAPHY NECK: CPT

## 2025-01-13 PROCEDURE — 82565 ASSAY OF CREATININE: CPT

## 2025-01-13 PROCEDURE — 2550000001 HC RX 255 CONTRASTS

## 2025-01-13 PROCEDURE — 70498 CT ANGIOGRAPHY NECK: CPT | Performed by: RADIOLOGY

## 2025-01-13 RX ADMIN — IOHEXOL 75 ML: 350 INJECTION, SOLUTION INTRAVENOUS at 08:01

## 2025-01-16 ENCOUNTER — APPOINTMENT (OUTPATIENT)
Dept: NEPHROLOGY | Facility: CLINIC | Age: 80
End: 2025-01-16
Payer: COMMERCIAL

## 2025-01-16 VITALS
DIASTOLIC BLOOD PRESSURE: 58 MMHG | WEIGHT: 175.5 LBS | HEART RATE: 63 BPM | HEIGHT: 65 IN | BODY MASS INDEX: 29.24 KG/M2 | SYSTOLIC BLOOD PRESSURE: 139 MMHG

## 2025-01-16 DIAGNOSIS — N18.4 CKD (CHRONIC KIDNEY DISEASE) STAGE 4, GFR 15-29 ML/MIN (MULTI): Primary | Chronic | ICD-10-CM

## 2025-01-16 DIAGNOSIS — I10 ESSENTIAL HYPERTENSION: Chronic | ICD-10-CM

## 2025-01-16 PROCEDURE — 3075F SYST BP GE 130 - 139MM HG: CPT | Performed by: NURSE PRACTITIONER

## 2025-01-16 PROCEDURE — 99215 OFFICE O/P EST HI 40 MIN: CPT | Performed by: NURSE PRACTITIONER

## 2025-01-16 PROCEDURE — 3078F DIAST BP <80 MM HG: CPT | Performed by: NURSE PRACTITIONER

## 2025-01-16 ASSESSMENT — ENCOUNTER SYMPTOMS
ROS SKIN COMMENTS: NO ITCHING
DIZZINESS: 0
PSYCHIATRIC NEGATIVE: 1
LIGHT-HEADEDNESS: 0
NAUSEA: 0
DIFFICULTY URINATING: 0
VOMITING: 0
APPETITE CHANGE: 0
SHORTNESS OF BREATH: 0

## 2025-01-16 NOTE — PROGRESS NOTES
Subjective   Patient ID: Leanna Carter is a 79 y.o. female who presents for Follow-up (CKD stage 4).  HPI  Pt presents for CKD stage 4 follow up   Very concerned today that outside PCP reported kidney function is much worse.  Daughter on phone during appt (comes in and out)  On 1/13 had test at [facility] for carotid bruit; Had POCT to receive contrast:  Cr 1.43 and eGFR 37ml/min  Lab results from outside lab: Sept: Cr 1.92 with eGFR 26ml/min; Dec 18 Cr 2.27 with [id number]/min  No new symptoms or c/o  PT 3x/week for back pain which is improving   No NSAIDS   Still following with Dr Quan for MGUS  Review of Systems   Constitutional:  Negative for appetite change.        Feeling more tired for last week d/t disrupted sleep    Respiratory:  Negative for shortness of breath.    Cardiovascular:  Negative for chest pain.        WALKER when lifting rollator to car or walking a distance   No nocturnal breathing issues    Gastrointestinal:  Negative for nausea and vomiting.        New dentures that afffect taste of food.    Genitourinary:  Negative for difficulty urinating and dyspareunia.   Skin:  Negative for rash.        No itching   Neurological:  Negative for dizziness and light-headedness.   Psychiatric/Behavioral: Negative.         Objective   Physical Exam  Constitutional:       General: She is not in acute distress.     Appearance: Normal appearance. She is not ill-appearing.   Cardiovascular:      Rate and Rhythm: Normal rate and regular rhythm.      Heart sounds: Normal heart sounds.   Pulmonary:      Effort: Pulmonary effort is normal.      Breath sounds: Normal breath sounds.   Abdominal:      Palpations: Abdomen is soft.   Musculoskeletal:      Right lower leg: No edema.      Left lower leg: No edema.   Skin:     General: Skin is warm and dry.   Neurological:      Mental Status: She is alert and oriented to person, place, and time.   Psychiatric:         Mood and Affect: Mood normal.         Behavior: Behavior  "normal.      Comments: anxious       Results  Lab Results   Component Value Date    CREATININE 2.31 (H) 10/12/2023    BUN 21 10/12/2023     10/12/2023    K 4.4 10/12/2023     10/12/2023    CO2 27 10/12/2023        Lab Results   Component Value Date    WBC 10.3 10/12/2023    RBC 3.68 (L) 10/12/2023    HGB 10.3 (L) 10/12/2023    HCT 31.5 (L) 10/12/2023    MCV 86 10/12/2023    MCH 28.0 10/12/2023    RDW 14.2 10/12/2023     10/12/2023      /58 (BP Location: Right arm, Patient Position: Sitting, BP Cuff Size: Adult)   Pulse 63   Ht 1.651 m (5' 5\")   Wt 79.6 kg (175 lb 8 oz)   LMP  (LMP Unknown)   BMI 29.20 kg/m²     Assessment/Plan   Diagnoses and all orders for this visit:  CKD (chronic kidney disease) stage 4, GFR 15-29 ml/min ([facility]) due to DM  Kidney function fluctuates but overall stable.  Pt very anxious today that kidney are rapidly declining.  Reviewed lab results going back several years to see overall trend  Various physical c/o but not consistent with uremia   Repeat RFP  Essential hypertension  BP a bit above goal today though pt nervous for this appt  No changes at this time        MARIPOSA Rocha-CNP 01/16/25 12:37 PM   "

## 2025-01-16 NOTE — PATIENT INSTRUCTIONS
It was nice to see you Ms Carter   Your blood pressure is a little higher than usual  But you have been a little anxious today  The labs you brought in today show overall stable kidney function   Please return in mid May for follow up  Please get labs done at any  lab in next week or 2.   Please have done by 1/27    You have chronic kidney disease stage III. Avoid over the counter pain medications like Ibuprofen, naproxen, meloxicam, diclofenac, sulindac etc (NSAIDs).Tylenol is OK to use.Avoid IV contrast dye.Low sodium diet. BP goal less than 130/80 mm Hg.Tell all your health care providers you have chronic kidney disease stage III.Check labs 1 week before next visit.

## 2025-01-23 ENCOUNTER — LAB (OUTPATIENT)
Dept: LAB | Facility: LAB | Age: 80
End: 2025-01-23
Payer: COMMERCIAL

## 2025-01-23 DIAGNOSIS — N18.4 CKD (CHRONIC KIDNEY DISEASE) STAGE 4, GFR 15-29 ML/MIN (MULTI): Chronic | ICD-10-CM

## 2025-01-23 PROCEDURE — 82306 VITAMIN D 25 HYDROXY: CPT

## 2025-01-23 PROCEDURE — 80069 RENAL FUNCTION PANEL: CPT

## 2025-01-23 PROCEDURE — 83970 ASSAY OF PARATHORMONE: CPT

## 2025-01-24 DIAGNOSIS — N18.4 CKD (CHRONIC KIDNEY DISEASE) STAGE 4, GFR 15-29 ML/MIN (MULTI): Primary | Chronic | ICD-10-CM

## 2025-01-24 LAB
ALBUMIN SERPL BCP-MCNC: 4 G/DL (ref 3.4–5)
ANION GAP SERPL CALC-SCNC: 15 MMOL/L (ref 10–20)
BUN SERPL-MCNC: 43 MG/DL (ref 6–23)
CALCIUM SERPL-MCNC: 9.4 MG/DL (ref 8.6–10.6)
CHLORIDE SERPL-SCNC: 105 MMOL/L (ref 98–107)
CO2 SERPL-SCNC: 28 MMOL/L (ref 21–32)
CREAT SERPL-MCNC: 2.22 MG/DL (ref 0.5–1.05)
EGFRCR SERPLBLD CKD-EPI 2021: 22 ML/MIN/1.73M*2
GLUCOSE SERPL-MCNC: 100 MG/DL (ref 74–99)
PHOSPHATE SERPL-MCNC: 3.9 MG/DL (ref 2.5–4.9)
POTASSIUM SERPL-SCNC: 5.2 MMOL/L (ref 3.5–5.3)
PTH-INTACT SERPL-MCNC: 383.4 PG/ML (ref 18.5–88)
SODIUM SERPL-SCNC: 143 MMOL/L (ref 136–145)

## 2025-01-25 LAB — 25(OH)D3 SERPL-MCNC: 35 NG/ML (ref 30–100)

## 2025-01-27 DIAGNOSIS — E21.3 HYPERPARATHYROIDISM (MULTI): ICD-10-CM

## 2025-01-27 DIAGNOSIS — N18.4 CKD (CHRONIC KIDNEY DISEASE) STAGE 4, GFR 15-29 ML/MIN (MULTI): Primary | Chronic | ICD-10-CM

## 2025-01-27 RX ORDER — CALCITRIOL 0.25 UG/1
0.25 CAPSULE ORAL 3 TIMES WEEKLY
Qty: 12 CAPSULE | Refills: 5 | Status: SHIPPED | OUTPATIENT
Start: 2025-01-27 | End: 2025-07-26

## 2025-01-27 RX ORDER — BUMETANIDE 2 MG/1
2 TABLET ORAL DAILY
COMMUNITY
Start: 2024-04-03

## 2025-01-27 RX ORDER — ALLOPURINOL 100 MG/1
50 TABLET ORAL WEEKLY
COMMUNITY
Start: 2025-01-10

## 2025-01-29 DIAGNOSIS — N18.4 CKD (CHRONIC KIDNEY DISEASE) STAGE 4, GFR 15-29 ML/MIN (MULTI): Primary | Chronic | ICD-10-CM

## 2025-02-20 ENCOUNTER — APPOINTMENT (OUTPATIENT)
Dept: PHARMACY | Facility: HOSPITAL | Age: 80
End: 2025-02-20
Payer: COMMERCIAL

## 2025-02-25 NOTE — PROGRESS NOTES
Subjective   Patient ID: Leanna Carter is a 80 y.o. female who presents for ckd    Referring Provider: Mary Sanchez    [facility]  HPI: CKD stage 4/a2. last EGFR 22 sCr 2.22 UACR 30.9  HTN:  /58  Medications  Irbesartan 75mg every day  Bumetanide 2mg every day  Amlodipine 10mg every day    DM:  A1C 8.9  Medications  Humulin n 10 units in the evening    HLD:  LDL 68  On statin? Atorvastatin 80mg qd    Medications reviewed for appropriate dosing in setting of CKD  Recommended changes:  - no adjustments needed today    Objective     There were no vitals taken for this visit.     Labs  Lab Results   Component Value Date    BILITOT 0.4 10/12/2023    CALCIUM 9.4 01/23/2025    CO2 28 01/23/2025     01/23/2025    CREATININE 2.22 (H) 01/23/2025    GLUCOSE 100 (H) 01/23/2025    ALKPHOS 124 10/12/2023    K 5.2 01/23/2025    PROT 6.6 10/12/2023     01/23/2025    AST 52 (H) 10/12/2023    ALT 23 10/12/2023    BUN 43 (H) 01/23/2025    ANIONGAP 15 01/23/2025    MG 1.50 (L) 10/10/2023    PHOS 3.9 01/23/2025     08/10/2023    ALBUMIN 4.0 01/23/2025    GFRF 25 (A) 08/10/2023     Lab Results   Component Value Date    TRIG 145 08/15/2019    CHOL 126 08/15/2019    HDL 28.9 (A) 08/15/2019     Lab Results   Component Value Date    HGBA1C 8.9 (A) 08/20/2022       Current Outpatient Medications on File Prior to Visit   Medication Sig Dispense Refill    allopurinol (Zyloprim) 100 mg tablet Take 0.5 tablets (50 mg) by mouth 1 (one) time per week.      amLODIPine (Norvasc) 10 mg tablet Take 1 tablet (10 mg) by mouth once daily.      atorvastatin (Lipitor) 80 mg tablet Take 1 tablet (80 mg) by mouth once daily.      bumetanide (Bumex) 2 mg tablet Take 1 tablet (2 mg) by mouth once daily.      calcitriol (Rocaltrol) 0.25 mcg capsule Take 1 capsule (0.25 mcg) by mouth 3 times a week. 12 capsule 5    cholecalciferol, vitamin D3, (D3-5000 ORAL) Take 1 tablet by mouth 1 (one) time each day. Chewable, for vitamin D deficiency       clobetasol (Temovate) 0.05 % ointment APPLY SPARINGLY TO AFFECTED AREA(S) TWICE DAILY      clopidogrel (Plavix) 75 mg tablet Take 1 tablet (75 mg) by mouth once daily.      irbesartan (Avapro) 75 mg tablet TAKE 1 TABLET BY MOUTH EVERY DAY 30 tablet 3     No current facility-administered medications on file prior to visit.        Assessment/Plan   PATIENT EDUCATION/DISCUSSION:  - Counseled patient on MOA, expectations, side effects, duration of therapy, contraindications, administration, and monitoring parameters  - Answered all patient questions and concerns  - majority of appointment spent on med rec. Meds are up to date as they appear in epic as of today.  - farxiga covered at $0 on plan will have full discussion at next visit  _______________________________________________________________________  PLAN  1. No changes today.     4/2 @ [id number]  Angel Katz, PharmD    Continue all meds under the continuation of care with the referring provider and clinical pharmacy team.

## 2025-02-26 ENCOUNTER — APPOINTMENT (OUTPATIENT)
Dept: PHARMACY | Facility: HOSPITAL | Age: 80
End: 2025-02-26
Payer: COMMERCIAL

## 2025-02-26 DIAGNOSIS — N18.4 CKD (CHRONIC KIDNEY DISEASE) STAGE 4, GFR 15-29 ML/MIN (MULTI): Chronic | ICD-10-CM

## 2025-02-26 RX ORDER — POTASSIUM CHLORIDE 20 MEQ/1
20 TABLET, EXTENDED RELEASE ORAL DAILY
COMMUNITY

## 2025-02-26 RX ORDER — TRAMADOL HYDROCHLORIDE 50 MG/1
50 TABLET ORAL
COMMUNITY

## 2025-02-26 RX ORDER — PSYLLIUM HUSK 0.4 G
CAPSULE ORAL
COMMUNITY

## 2025-04-02 ENCOUNTER — APPOINTMENT (OUTPATIENT)
Dept: PHARMACY | Facility: HOSPITAL | Age: 80
End: 2025-04-02
Payer: COMMERCIAL

## 2025-04-23 ENCOUNTER — APPOINTMENT (OUTPATIENT)
Dept: PHARMACY | Facility: HOSPITAL | Age: 80
End: 2025-04-23
Payer: COMMERCIAL

## 2025-04-23 DIAGNOSIS — N18.4 CKD (CHRONIC KIDNEY DISEASE) STAGE 4, GFR 15-29 ML/MIN (MULTI): Chronic | ICD-10-CM

## 2025-04-23 NOTE — PROGRESS NOTES
Subjective   Patient ID: Leanna Carter is a 80 y.o. female who presents for ckd    Referring Provider: Mary Sanchez    \[facility] [place] [facility]\""  HPI: CKD stage 4/a2. last EGFR 22 sCr 2.22 UACR 30.9  HTN:  /58  Medications  Irbesartan 75mg every day  Bumetanide 2mg every day  Amlodipine 10mg every day    DM:  A1C 8.9  Medications  Humulin n 10 units in the evening    HLD:  LDL 68  On statin? Atorvastatin 80mg qd    Medications reviewed for appropriate dosing in setting of CKD  Recommended changes:  - no adjustments needed today    Objective     There were no vitals taken for this visit.     Labs  Lab Results   Component Value Date    BILITOT 0.4 10/12/2023    CALCIUM 9.4 01/23/2025    CO2 28 01/23/2025     01/23/2025    CREATININE 2.22 (H) 01/23/2025    GLUCOSE 100 (H) 01/23/2025    ALKPHOS 124 10/12/2023    K 5.2 01/23/2025    PROT 6.6 10/12/2023     01/23/2025    AST 52 (H) 10/12/2023    ALT 23 10/12/2023    BUN 43 (H) 01/23/2025    ANIONGAP 15 01/23/2025    MG 1.50 (L) 10/10/2023    PHOS 3.9 01/23/2025     08/10/2023    ALBUMIN 4.0 01/23/2025    GFRF 25 (A) 08/10/2023     Lab Results   Component Value Date    TRIG 145 08/15/2019    CHOL 126 08/15/2019    HDL 28.9 (A) 08/15/2019     Lab Results   Component Value Date    HGBA1C 8.9 (A) 08/20/2022       Current Outpatient Medications on File Prior to Visit   Medication Sig Dispense Refill    allopurinol (Zyloprim) 100 mg tablet Take 0.5 tablets (50 mg) by mouth 1 (one) time per week.      amLODIPine (Norvasc) 10 mg tablet Take 1 tablet (10 mg) by mouth once daily.      atorvastatin (Lipitor) 80 mg tablet Take 1 tablet (80 mg) by mouth once daily.      bumetanide (Bumex) 2 mg tablet Take 1 tablet (2 mg) by mouth once daily.      calcitriol (Rocaltrol) 0.25 mcg capsule Take 1 capsule (0.25 mcg) by mouth 3 times a week. 12 capsule 5    calcium carbonate-vitamin D3 1,000 mg-20 mcg (800 unit) tablet Take by mouth.      clobetasol (Temovate) 0.05 % ointment APPLY  SPARINGLY TO AFFECTED AREA(S) TWICE DAILY      clopidogrel (Plavix) 75 mg tablet Take 1 tablet (75 mg) by mouth once daily.      irbesartan (Avapro) 75 mg tablet TAKE 1 TABLET BY MOUTH EVERY DAY 30 tablet 3    potassium chloride CR 20 mEq ER tablet Take 1 tablet (20 mEq) by mouth once daily. Do not crush or chew.      traMADol (Ultram) 50 mg tablet Take 1 tablet (50 mg) by mouth.       No current facility-administered medications on file prior to visit.        Assessment/Plan   PATIENT EDUCATION/DISCUSSION:  - Took some time of the appointment spent on med rec. Meds are up to date as they appear in epic as of today. She also mentioned taking Cyclobenzaprine as well, unsure on dosage   - farxiga covered at $0 on plan will have full discussion at next visit  - Wanted to talk with Dr. Sanchez before starting Farxiga treatment  -Was completely out of calcitriol 0.25 mcg capsule and needed to be refilled  - Will follow up in late May when patient has talked to doctor about Farxiga     _______________________________________________________________________  PLAN  1. No changes today.     Follow up on 5/21 @2:00p  Wes Quigley    Continue all meds under the continuation of care with the referring provider and clinical pharmacy team.

## 2025-05-15 ENCOUNTER — APPOINTMENT (OUTPATIENT)
Dept: NEPHROLOGY | Facility: CLINIC | Age: 80
End: 2025-05-15
Payer: COMMERCIAL

## 2025-05-15 VITALS
DIASTOLIC BLOOD PRESSURE: 60 MMHG | BODY MASS INDEX: 29.66 KG/M2 | SYSTOLIC BLOOD PRESSURE: 124 MMHG | HEART RATE: 82 BPM | WEIGHT: 178 LBS | HEIGHT: 65 IN

## 2025-05-15 DIAGNOSIS — E21.3 HYPERPARATHYROIDISM (MULTI): ICD-10-CM

## 2025-05-15 DIAGNOSIS — I10 ESSENTIAL HYPERTENSION: Chronic | ICD-10-CM

## 2025-05-15 DIAGNOSIS — N18.4 CKD (CHRONIC KIDNEY DISEASE) STAGE 4, GFR 15-29 ML/MIN (MULTI): Primary | Chronic | ICD-10-CM

## 2025-05-15 DIAGNOSIS — E79.0 HYPERURICEMIA: ICD-10-CM

## 2025-05-15 PROCEDURE — 99215 OFFICE O/P EST HI 40 MIN: CPT | Performed by: NURSE PRACTITIONER

## 2025-05-15 PROCEDURE — 1159F MED LIST DOCD IN RCRD: CPT | Performed by: NURSE PRACTITIONER

## 2025-05-15 PROCEDURE — 3074F SYST BP LT 130 MM HG: CPT | Performed by: NURSE PRACTITIONER

## 2025-05-15 PROCEDURE — 1160F RVW MEDS BY RX/DR IN RCRD: CPT | Performed by: NURSE PRACTITIONER

## 2025-05-15 PROCEDURE — 1126F AMNT PAIN NOTED NONE PRSNT: CPT | Performed by: NURSE PRACTITIONER

## 2025-05-15 PROCEDURE — 3078F DIAST BP <80 MM HG: CPT | Performed by: NURSE PRACTITIONER

## 2025-05-15 RX ORDER — CALCITRIOL 0.25 UG/1
0.25 CAPSULE ORAL 3 TIMES WEEKLY
Qty: 12 CAPSULE | Refills: 5 | Status: SHIPPED | OUTPATIENT
Start: 2025-05-16 | End: 2025-11-12

## 2025-05-15 ASSESSMENT — ENCOUNTER SYMPTOMS
VOMITING: 0
SHORTNESS OF BREATH: 0
ROS SKIN COMMENTS: NO ITCHING
NAUSEA: 0
DYSURIA: 0
ACTIVITY CHANGE: 0
DEPRESSION: 1
LOSS OF SENSATION IN FEET: 1
PSYCHIATRIC NEGATIVE: 1
OCCASIONAL FEELINGS OF UNSTEADINESS: 1
APPETITE CHANGE: 0

## 2025-05-15 ASSESSMENT — PATIENT HEALTH QUESTIONNAIRE - PHQ9
8. MOVING OR SPEAKING SO SLOWLY THAT OTHER PEOPLE COULD HAVE NOTICED. OR THE OPPOSITE, BEING SO FIGETY OR RESTLESS THAT YOU HAVE BEEN MOVING AROUND A LOT MORE THAN USUAL: NOT AT ALL
9. THOUGHTS THAT YOU WOULD BE BETTER OFF DEAD, OR OF HURTING YOURSELF: NOT AT ALL
6. FEELING BAD ABOUT YOURSELF - OR THAT YOU ARE A FAILURE OR HAVE LET YOURSELF OR YOUR FAMILY DOWN: SEVERAL DAYS
3. TROUBLE FALLING OR STAYING ASLEEP OR SLEEPING TOO MUCH: NEARLY EVERY DAY
10. IF YOU CHECKED OFF ANY PROBLEMS, HOW DIFFICULT HAVE THESE PROBLEMS MADE IT FOR YOU TO DO YOUR WORK, TAKE CARE OF THINGS AT HOME, OR GET ALONG WITH OTHER PEOPLE: NOT DIFFICULT AT ALL
4. FEELING TIRED OR HAVING LITTLE ENERGY: NEARLY EVERY DAY
7. TROUBLE CONCENTRATING ON THINGS, SUCH AS READING THE NEWSPAPER OR WATCHING TELEVISION: NOT AT ALL
1. LITTLE INTEREST OR PLEASURE IN DOING THINGS: MORE THAN HALF THE DAYS
2. FEELING DOWN, DEPRESSED OR HOPELESS: SEVERAL DAYS
SUM OF ALL RESPONSES TO PHQ QUESTIONS 1-9: 11
5. POOR APPETITE OR OVEREATING: SEVERAL DAYS
SUM OF ALL RESPONSES TO PHQ9 QUESTIONS 1 AND 2: 3

## 2025-05-15 ASSESSMENT — PAIN SCALES - GENERAL: PAINLEVEL_OUTOF10: 0-NO PAIN

## 2025-05-15 NOTE — PATIENT INSTRUCTIONS
It was nice to meet you Ms Carter  Please have labs done today  Blood pressure is very well controlled  Kidney function in April was very stable, slightly better  Please return in 3 months     You have chronic kidney disease stage 4. Avoid over the counter pain medications like Ibuprofen, naproxen, meloxicam, diclofenac, sulindac etc (NSAIDs).Tylenol is OK to use.Avoid IV contrast dye.Low sodium diet. BP goal less than 130/80 mm Hg.Tell all your health care providers you have chronic kidney disease stage 4. Check labs 1 week before next visit.

## 2025-05-15 NOTE — PROGRESS NOTES
Subjective   Patient ID: Leanna Carter is a 80 y.o. female who presents for Chronic Kidney Disease (Follow up visit for CKD).  HPI  Pt presents for CKD stage 4 follow up. Daughter Katie present by phone.  Pmh HTN, DM (last A1c in 2022 at 8.9) TIAs and CVAs, HFpEF, NAFLD and prior NSAID use. MGUS (IgG Lambda) which is closely monitored by Dr Schneider (last visit in April)  Last seen in Jan. Following that appt med list received from daughter on 1/27 to [facility] rec.  Furosemide discontinued and bumex 2mg added  Cr at that time was 2.22 with eGFR 22ml/min which was stable   Feb 2024, UA neg for pro, Tpr//Cr neg, Alb/Cr 30.9  Labs done in April at OSH with Cr 1.95 and eGFR 26ml/min. 24 hr urine 3/2/24:  0.15 Pro, (<0.15 is normal)   No ED visits or hospital admissions   No new symptoms or c/o   Feeling good  Has BP cuff at home but readings are usually higher   Ray from clinical pharmacist. Unsure about starting SGLT2-I  No NSAIDS      Review of Systems   Constitutional:  Negative for activity change and appetite change.        Has PT twice weekly  Some days tired   Feels that her new dentures affect the taste of food   Respiratory:  Negative for shortness of breath.    Cardiovascular:  Negative for chest pain.        WALKER when lifting walker or when walking fast   No orthopnea or PND    Gastrointestinal:  Negative for nausea and vomiting.   Genitourinary:  Negative for dyspareunia and dysuria.   Skin:  Negative for rash.        No itching   Neurological:         Occ feels lightheaded   No symptoms when standing from sitting   Psychiatric/Behavioral: Negative.         Objective   Physical Exam  Constitutional:       General: She is not in acute distress.     Appearance: Normal appearance. She is not ill-appearing.   HENT:      Mouth/Throat:      Mouth: Mucous membranes are moist.   Cardiovascular:      Rate and Rhythm: Normal rate and regular rhythm.      Heart sounds: No murmur heard.     No friction rub.  "  Pulmonary:      Effort: Pulmonary effort is normal.      Breath sounds: Normal breath sounds.   Abdominal:      Palpations: Abdomen is soft.      Comments: nontender   Musculoskeletal:      Right lower leg: No edema.      Left lower leg: No edema.   Skin:     General: Skin is warm and dry.   Neurological:      General: No focal deficit present.      Mental Status: She is alert and oriented to person, place, and time.   Psychiatric:         Mood and Affect: Mood normal.         Behavior: Behavior normal.       Results  Lab Results   Component Value Date    CREATININE 2.22 (H) 01/23/2025    BUN 43 (H) 01/23/2025     01/23/2025    K 5.2 01/23/2025     01/23/2025    CO2 28 01/23/2025      Lab Results   Component Value Date    EGFR 22 (L) 01/23/2025     Lab Results   Component Value Date    WBC 10.3 10/12/2023    RBC 3.68 (L) 10/12/2023    HGB 10.3 (L) 10/12/2023    HCT 31.5 (L) 10/12/2023    MCV 86 10/12/2023    MCH 28.0 10/12/2023    RDW 14.2 10/12/2023     10/12/2023      /60 (BP Location: Right arm, Patient Position: Sitting, BP Cuff Size: Adult)   Pulse 82   Ht 1.651 m (5' 5\")   Wt 80.7 kg (178 lb)   BMI 29.62 kg/m²     Assessment/Plan   Diagnoses and all orders for this visit:  CKD (chronic kidney disease) stage 4, GFR 15-29 ml/min (Multi)  Likely d/t history  of poorly controlled DM and HTN  With last urine checks neg for proteinuria and minimal albuminuria  Cr remains stable with recent labs at OSH with Cr 1.95 and eGFR 26ml/min  As expected no symptoms consistent with uremia   Referred to clinical pharmacy after last visit to eval for SGLT2I and overall med rec.  Discussed today. Pt concerned about potential risks. Will discuss again with pharm and upcoming visit   Labs today.  RTC in 3mths   Essential hypertension  BP well controlled on current regimen  Appears euvolemic on exam  Wt stable  Hyperparathyroidism (Multi)  Did not start calcitriol in Jan d/t needing " "PA  reordered  Repeat PTH today  Check Ca+ on RFP  Hyperuricemia  No recent gout flares  Avoids red meat and other triggers  Last level 11.9 in Feb 2024  Taking 50mg per week per pcp   Repeat level        5/19: spoke to pt by phone today.  Several labs not done by lab d/t ordered for \"tomorrow\" (5/16) vs \"today\" (5/15)  Pt will go to lab this week for RFP and CBC  Instructed to hold on calcitriol (has not been picked up yet) until labs resulted and I call her back.     Mary Sanchez, MARIPOSA-CNP 05/15/25 11:28 AM   "

## 2025-05-16 DIAGNOSIS — N18.32 STAGE 3B CHRONIC KIDNEY DISEASE (MULTI): ICD-10-CM

## 2025-05-16 DIAGNOSIS — E21.3 HYPERPARATHYROIDISM (MULTI): ICD-10-CM

## 2025-05-16 DIAGNOSIS — N18.4 CKD (CHRONIC KIDNEY DISEASE) STAGE 4, GFR 15-29 ML/MIN (MULTI): Chronic | ICD-10-CM

## 2025-05-16 DIAGNOSIS — E79.0 HYPERURICEMIA: ICD-10-CM

## 2025-05-16 LAB
ALBUMIN/CREAT UR: 5 MG/G CREAT
CREAT UR-MCNC: 78 MG/DL (ref 20–275)
CREAT UR-MCNC: 78 MG/DL (ref 20–275)
FERRITIN SERPL-MCNC: 40 NG/ML (ref 16–288)
MICROALBUMIN UR-MCNC: 0.4 MG/DL
PROT UR-MCNC: 8 MG/DL (ref 5–24)
PROT/CREAT UR: 0.1 MG/MG CREAT (ref 0.02–0.18)
PROT/CREAT UR: 103 MG/G CREAT (ref 24–184)
PTH-INTACT SERPL-MCNC: 317 PG/ML (ref 16–77)

## 2025-05-20 LAB
ALBUMIN/CREAT UR: 5 MG/G CREAT
CREAT UR-MCNC: 78 MG/DL (ref 20–275)
CREAT UR-MCNC: 78 MG/DL (ref 20–275)
FERRITIN SERPL-MCNC: 40 NG/ML (ref 16–288)
IRON SATN MFR SERPL: 28 % (CALC) (ref 16–45)
IRON SERPL-MCNC: 88 MCG/DL (ref 45–160)
MICROALBUMIN UR-MCNC: 0.4 MG/DL
PROT UR-MCNC: 8 MG/DL (ref 5–24)
PROT/CREAT UR: 0.1 MG/MG CREAT (ref 0.02–0.18)
PROT/CREAT UR: 103 MG/G CREAT (ref 24–184)
PTH-INTACT SERPL-MCNC: 317 PG/ML (ref 16–77)
TIBC SERPL-MCNC: 317 MCG/DL (CALC) (ref 250–450)
URATE SERPL-MCNC: 7.6 MG/DL (ref 2.5–7)

## 2025-05-21 ENCOUNTER — APPOINTMENT (OUTPATIENT)
Dept: PHARMACY | Facility: HOSPITAL | Age: 80
End: 2025-05-21
Payer: COMMERCIAL

## 2025-05-21 LAB
ALBUMIN SERPL-MCNC: 4.1 G/DL (ref 3.6–5.1)
BUN SERPL-MCNC: 45 MG/DL (ref 7–25)
BUN/CREAT SERPL: 23 (CALC) (ref 6–22)
CALCIUM SERPL-MCNC: 9.2 MG/DL (ref 8.6–10.4)
CHLORIDE SERPL-SCNC: 106 MMOL/L (ref 98–110)
CO2 SERPL-SCNC: 26 MMOL/L (ref 20–32)
CREAT SERPL-MCNC: 1.98 MG/DL (ref 0.6–0.95)
EGFRCR SERPLBLD CKD-EPI 2021: 25 ML/MIN/1.73M2
ERYTHROCYTE [DISTWIDTH] IN BLOOD BY AUTOMATED COUNT: 13.3 % (ref 11–15)
GLUCOSE SERPL-MCNC: 76 MG/DL (ref 65–99)
HCT VFR BLD AUTO: 35.7 % (ref 35–45)
HGB BLD-MCNC: 11.6 G/DL (ref 11.7–15.5)
IRON SATN MFR SERPL: 19 % (CALC) (ref 16–45)
IRON SERPL-MCNC: 62 MCG/DL (ref 45–160)
MCH RBC QN AUTO: 29.3 PG (ref 27–33)
MCHC RBC AUTO-ENTMCNC: 32.5 G/DL (ref 32–36)
MCV RBC AUTO: 90.2 FL (ref 80–100)
PHOSPHATE SERPL-MCNC: 4.1 MG/DL (ref 2.1–4.3)
PLATELET # BLD AUTO: 204 THOUSAND/UL (ref 140–400)
PMV BLD REES-ECKER: 10.6 FL (ref 7.5–12.5)
POTASSIUM SERPL-SCNC: 4.6 MMOL/L (ref 3.5–5.3)
RBC # BLD AUTO: 3.96 MILLION/UL (ref 3.8–5.1)
SODIUM SERPL-SCNC: 141 MMOL/L (ref 135–146)
TIBC SERPL-MCNC: 320 MCG/DL (CALC) (ref 250–450)
URATE SERPL-MCNC: 7.3 MG/DL (ref 2.5–7)
WBC # BLD AUTO: 8.4 THOUSAND/UL (ref 3.8–10.8)

## 2025-06-03 ENCOUNTER — APPOINTMENT (OUTPATIENT)
Dept: PHARMACY | Facility: HOSPITAL | Age: 80
End: 2025-06-03
Payer: COMMERCIAL

## 2025-06-03 DIAGNOSIS — N18.4 CKD (CHRONIC KIDNEY DISEASE) STAGE 4, GFR 15-29 ML/MIN (MULTI): Chronic | ICD-10-CM

## 2025-06-03 NOTE — PROGRESS NOTES
Subjective   Patient ID: Leanna Carter is a 80 y.o. female who presents for ckd    Referring Provider: Mary Sanchez    [facility]  HPI: CKD stage 4/a2. last EGFR 22 sCr 2.22 UACR 30.9  HTN:  /58  144/71  139/69  138/63    Medications  Irbesartan 75mg every day  Bumetanide 2mg every day  Amlodipine 10mg every day    DM:  A1C 8.9  Medications  Humulin n 10 units in the evening    HLD:  LDL 68  On statin? Atorvastatin 80mg qd    Medications reviewed for appropriate dosing in setting of CKD  Recommended changes:  - no adjustments needed today    Objective     There were no vitals taken for this visit.     Labs  Lab Results   Component Value Date    BILITOT 0.4 10/12/2023    CALCIUM 9.2 05/20/2025    CO2 26 05/20/2025     05/20/2025    CREATININE 1.98 (H) 05/20/2025    GLUCOSE 76 05/20/2025    ALKPHOS 124 10/12/2023    K 4.6 05/20/2025    PROT 6.6 10/12/2023     05/20/2025    AST 52 (H) 10/12/2023    ALT 23 10/12/2023    BUN 45 (H) 05/20/2025    ANIONGAP 15 01/23/2025    MG 1.50 (L) 10/10/2023    PHOS 4.1 05/20/2025     08/10/2023    ALBUMIN 4.1 05/20/2025    GFRF 25 (A) 08/10/2023     Lab Results   Component Value Date    TRIG 145 08/15/2019    CHOL 126 08/15/2019    HDL 28.9 (A) 08/15/2019     Lab Results   Component Value Date    HGBA1C 8.9 (A) 08/20/2022       Current Outpatient Medications on File Prior to Visit   Medication Sig Dispense Refill    allopurinol (Zyloprim) 100 mg tablet Take 0.5 tablets (50 mg) by mouth 1 (one) time per week.      amLODIPine (Norvasc) 10 mg tablet Take 1 tablet (10 mg) by mouth once daily.      atorvastatin (Lipitor) 80 mg tablet Take 1 tablet (80 mg) by mouth once daily.      bumetanide (Bumex) 2 mg tablet Take 1 tablet (2 mg) by mouth once daily.      calcitriol (Rocaltrol) 0.25 mcg capsule Take 1 capsule (0.25 mcg) by mouth 3 times a week. 12 capsule 5    calcium carbonate-vitamin D3 1,000 mg-20 mcg (800 unit) tablet Take by mouth.      clopidogrel (Plavix) 75 mg  tablet Take 1 tablet (75 mg) by mouth once daily.      irbesartan (Avapro) 75 mg tablet TAKE 1 TABLET BY MOUTH EVERY DAY 30 tablet 3    potassium chloride CR 20 mEq ER tablet Take 1 tablet (20 mEq) by mouth once daily. Do not crush or chew.      traMADol (Ultram) 50 mg tablet Take 1 tablet (50 mg) by mouth.       No current facility-administered medications on file prior to visit.        Assessment/Plan   PATIENT EDUCATION/DISCUSSION:  - farxiga covered at $0 on plan but patient hesitant to start. Reports was told by pcp that it was not necessary. We reviewed benefits however patient against initiation.  -     _______________________________________________________________________  PLAN  1. No changes today.     Angel Katz, PharmD    Continue all meds under the continuation of care with the referring provider and clinical pharmacy team.

## 2025-09-03 DIAGNOSIS — N18.4 CKD (CHRONIC KIDNEY DISEASE) STAGE 4, GFR 15-29 ML/MIN (MULTI): Primary | Chronic | ICD-10-CM

## 2025-09-03 DIAGNOSIS — D63.1 ANEMIA DUE TO STAGE 4 CHRONIC KIDNEY DISEASE: ICD-10-CM

## 2025-09-03 DIAGNOSIS — N18.4 ANEMIA DUE TO STAGE 4 CHRONIC KIDNEY DISEASE: ICD-10-CM

## 2025-09-05 LAB
25(OH)D3+25(OH)D2 SERPL-MCNC: 32 NG/ML (ref 30–100)
ALBUMIN SERPL-MCNC: 4.1 G/DL (ref 3.6–5.1)
ALBUMIN/CREAT UR: 65 MG/G CREAT
APPEARANCE UR: CLEAR
BACTERIA #/AREA URNS HPF: ABNORMAL /HPF
BILIRUB UR QL STRIP: NEGATIVE
BUN SERPL-MCNC: 24 MG/DL (ref 7–25)
BUN/CREAT SERPL: 12 (CALC) (ref 6–22)
CALCIUM SERPL-MCNC: 9.4 MG/DL (ref 8.6–10.4)
CHLORIDE SERPL-SCNC: 106 MMOL/L (ref 98–110)
CO2 SERPL-SCNC: 28 MMOL/L (ref 20–32)
COLOR UR: YELLOW
CREAT SERPL-MCNC: 2 MG/DL (ref 0.6–0.95)
CREAT UR-MCNC: 80 MG/DL (ref 20–275)
CREAT UR-MCNC: 80 MG/DL (ref 20–275)
EGFRCR SERPLBLD CKD-EPI 2021: 25 ML/MIN/1.73M2
ERYTHROCYTE [DISTWIDTH] IN BLOOD BY AUTOMATED COUNT: 13.5 % (ref 11–15)
FERRITIN SERPL-MCNC: 69 NG/ML (ref 16–288)
GLUCOSE SERPL-MCNC: 144 MG/DL (ref 65–99)
GLUCOSE UR QL STRIP: NEGATIVE
HCT VFR BLD AUTO: 35 % (ref 35–45)
HGB BLD-MCNC: 11.6 G/DL (ref 11.7–15.5)
HGB UR QL STRIP: NEGATIVE
HYALINE CASTS #/AREA URNS LPF: ABNORMAL /LPF
IRON SATN MFR SERPL: 34 % (CALC) (ref 16–45)
IRON SERPL-MCNC: 100 MCG/DL (ref 45–160)
KETONES UR QL STRIP: NEGATIVE
LEUKOCYTE ESTERASE UR QL STRIP: NEGATIVE
MCH RBC QN AUTO: 29.7 PG (ref 27–33)
MCHC RBC AUTO-ENTMCNC: 33.1 G/DL (ref 32–36)
MCV RBC AUTO: 89.5 FL (ref 80–100)
MICROALBUMIN UR-MCNC: 5.2 MG/DL
NITRITE UR QL STRIP: NEGATIVE
PH UR STRIP: 7.5 [PH] (ref 5–8)
PHOSPHATE SERPL-MCNC: 3.5 MG/DL (ref 2.1–4.3)
PLATELET # BLD AUTO: 220 THOUSAND/UL (ref 140–400)
PMV BLD REES-ECKER: 10.9 FL (ref 7.5–12.5)
POTASSIUM SERPL-SCNC: 4.9 MMOL/L (ref 3.5–5.3)
PROT UR QL STRIP: ABNORMAL
PROT UR-MCNC: 20 MG/DL (ref 5–24)
PROT/CREAT UR: 0.25 MG/MG CREAT (ref 0.02–0.18)
PROT/CREAT UR: 250 MG/G CREAT (ref 24–184)
PTH-INTACT SERPL-MCNC: 223 PG/ML (ref 16–77)
RBC # BLD AUTO: 3.91 MILLION/UL (ref 3.8–5.1)
RBC #/AREA URNS HPF: ABNORMAL /HPF
SERVICE CMNT-IMP: ABNORMAL
SODIUM SERPL-SCNC: 143 MMOL/L (ref 135–146)
SP GR UR STRIP: 1.01 (ref 1–1.03)
SQUAMOUS #/AREA URNS HPF: ABNORMAL /HPF
TIBC SERPL-MCNC: 296 MCG/DL (CALC) (ref 250–450)
WBC # BLD AUTO: 7.2 THOUSAND/UL (ref 3.8–10.8)
WBC #/AREA URNS HPF: ABNORMAL /HPF

## 2025-09-11 ENCOUNTER — APPOINTMENT (OUTPATIENT)
Dept: NEPHROLOGY | Facility: CLINIC | Age: 80
End: 2025-09-11
Payer: COMMERCIAL

## (undated) DEVICE — APPLIER, 5MM ENDOSCOPIC, HEM-O-LOCK, W/15 ML CLIPS

## (undated) DEVICE — TUBE SET, PNEUMOLAR HEATED, SMOKE EVACU, HIGH-FLOW

## (undated) DEVICE — PUMP, STRYKERFLOW 2 & HANDPIECE W/10FT. IRRIGATION TUBING

## (undated) DEVICE — SUTURE, VICRYL, 0, 27 IN, UR-6, VIOLET

## (undated) DEVICE — SYSTEM, TROCAR LAP, 5X100MM, SHIELD BLADED, KII ADVANCED FIX ABDOMINAL

## (undated) DEVICE — SUTURE, MONOCRYL, 4-0, 18 IN, PS2, UNDYED

## (undated) DEVICE — APPLICATOR, ARISTA, FLEXITIP, XL, LF

## (undated) DEVICE — HOLSTER, JET SAFETY

## (undated) DEVICE — CLIP, LIGATING, HEM-O-LOCK, MEDIUM/LARGE, LF, GREEN

## (undated) DEVICE — Device

## (undated) DEVICE — SCOPE WARMER, LAPAROSCOPE, BAG ONLY, LF

## (undated) DEVICE — RETRIEVAL SYSTEM, MONARCH, 10MM DISP ENDOSCOPIC

## (undated) DEVICE — CLIP, ENDO, CLINCH II, W/RATCHET, ON/OFF, CLINCH II, 5 MM

## (undated) DEVICE — TUBESET, HIGH FLOW II, 45 LITER PNEUMO SURE, DISP.

## (undated) DEVICE — CANNULA, KII ADVANCED FIXATION, 5X100MM W/SEAL

## (undated) DEVICE — GLOVE, SURGICAL, PROTEXIS PI ORTHO, 7.0, PF, LF

## (undated) DEVICE — HEMOSTAT, ARISTA, ABSORBABLE, 3 GRAMS

## (undated) DEVICE — FILTRATION DEVICE, PLUME PORT SEO LAPAROSCOPIC

## (undated) DEVICE — SHEAR, W/UNIPOLAR CAUTERY, ENDOSHEAR, 5 MM